# Patient Record
Sex: MALE | Race: WHITE | NOT HISPANIC OR LATINO | Employment: OTHER | URBAN - METROPOLITAN AREA
[De-identification: names, ages, dates, MRNs, and addresses within clinical notes are randomized per-mention and may not be internally consistent; named-entity substitution may affect disease eponyms.]

---

## 2020-09-23 ENCOUNTER — CONVERSION ENCOUNTER (OUTPATIENT)
Dept: UROLOGY | Facility: CLINIC | Age: 80
End: 2020-09-23

## 2020-09-23 ENCOUNTER — HOSPITAL ENCOUNTER (OUTPATIENT)
Dept: LAB | Facility: HOSPITAL | Age: 80
Discharge: HOME | End: 2020-09-23
Attending: UROLOGY

## 2020-09-23 ENCOUNTER — OFFICE VISIT CONVERTED (OUTPATIENT)
Dept: UROLOGY | Facility: CLINIC | Age: 80
End: 2020-09-23
Attending: UROLOGY

## 2020-09-23 LAB
ALBUMIN SERPL-MCNC: 3.7 G/DL (ref 3.5–5)
ALBUMIN/GLOB SERPL: 1.3 {RATIO} (ref 1.4–2.6)
ALP SERPL-CCNC: 128 U/L (ref 56–155)
ALT SERPL-CCNC: 7 U/L (ref 10–40)
ANION GAP SERPL CALC-SCNC: 18 MMOL/L (ref 8–19)
AST SERPL-CCNC: 12 U/L (ref 15–50)
BASOPHILS # BLD AUTO: 0.04 10*3/UL (ref 0–0.2)
BASOPHILS NFR BLD AUTO: 0.9 % (ref 0–3)
BILIRUB SERPL-MCNC: 0.38 MG/DL (ref 0.2–1.3)
BUN SERPL-MCNC: 17 MG/DL (ref 5–25)
BUN/CREAT SERPL: 13 {RATIO} (ref 6–20)
CALCIUM SERPL-MCNC: 9.4 MG/DL (ref 8.7–10.4)
CHLORIDE SERPL-SCNC: 101 MMOL/L (ref 99–111)
CONV ABS IMM GRAN: 0.01 10*3/UL (ref 0–0.2)
CONV CO2: 23 MMOL/L (ref 22–32)
CONV IMMATURE GRAN: 0.2 % (ref 0–1.8)
CONV TOTAL PROTEIN: 6.6 G/DL (ref 6.3–8.2)
CREAT UR-MCNC: 1.34 MG/DL (ref 0.7–1.2)
DEPRECATED RDW RBC AUTO: 50.9 FL (ref 35.1–43.9)
EOSINOPHIL # BLD AUTO: 0.12 10*3/UL (ref 0–0.7)
EOSINOPHIL # BLD AUTO: 2.6 % (ref 0–7)
ERYTHROCYTE [DISTWIDTH] IN BLOOD BY AUTOMATED COUNT: 15.5 % (ref 11.6–14.4)
GFR SERPLBLD BASED ON 1.73 SQ M-ARVRAT: 50 ML/MIN/{1.73_M2}
GLOBULIN UR ELPH-MCNC: 2.9 G/DL (ref 2–3.5)
GLUCOSE SERPL-MCNC: 101 MG/DL (ref 70–99)
HCT VFR BLD AUTO: 38.4 % (ref 42–52)
HGB BLD-MCNC: 11.9 G/DL (ref 14–18)
LYMPHOCYTES # BLD AUTO: 0.94 10*3/UL (ref 1–5)
LYMPHOCYTES NFR BLD AUTO: 20.7 % (ref 20–45)
MCH RBC QN AUTO: 28 PG (ref 27–31)
MCHC RBC AUTO-ENTMCNC: 31 G/DL (ref 33–37)
MCV RBC AUTO: 90.4 FL (ref 80–96)
MONOCYTES # BLD AUTO: 0.45 10*3/UL (ref 0.2–1.2)
MONOCYTES NFR BLD AUTO: 9.9 % (ref 3–10)
NEUTROPHILS # BLD AUTO: 2.99 10*3/UL (ref 2–8)
NEUTROPHILS NFR BLD AUTO: 65.7 % (ref 30–85)
NRBC CBCN: 0 % (ref 0–0.7)
OSMOLALITY SERPL CALC.SUM OF ELEC: 288 MOSM/KG (ref 273–304)
PLATELET # BLD AUTO: 234 10*3/UL (ref 130–400)
PMV BLD AUTO: 10.7 FL (ref 9.4–12.4)
POTASSIUM SERPL-SCNC: 4.2 MMOL/L (ref 3.5–5.3)
PSA SERPL-MCNC: 2.25 NG/ML (ref 0–4)
RBC # BLD AUTO: 4.25 10*6/UL (ref 4.7–6.1)
SODIUM SERPL-SCNC: 138 MMOL/L (ref 135–147)
WBC # BLD AUTO: 4.55 10*3/UL (ref 4.8–10.8)

## 2020-10-22 ENCOUNTER — HOSPITAL ENCOUNTER (OUTPATIENT)
Dept: NUCLEAR MEDICINE | Facility: HOSPITAL | Age: 80
Discharge: HOME | End: 2020-10-22
Attending: NURSE PRACTITIONER

## 2020-12-03 ENCOUNTER — HOSPITAL ENCOUNTER (OUTPATIENT)
Dept: CARDIOLOGY | Facility: HOSPITAL | Age: 80
Discharge: HOME | End: 2020-12-03
Attending: NURSE PRACTITIONER

## 2021-05-10 NOTE — H&P
History and Physical      Patient Name: Adarsh Loving   Patient ID: [id number]   Sex: Male   YOB: 1940    Primary Care Provider: Alf Moon   Referring Provider: Alf Moon    Visit Date: September 23, 2020    Provider: Sanjuana Carter MD   Location: [facility]   Location Address: [address] [place] [address], [address]  [place], [place]  [id number]   Location Phone: [phone]          Chief Complaint  · BILATERAL LESIONS ON KIDNEYS       History Of Present Illness  The patient is a 80 year old male , who presents for evaluation of necrotic lesion on both kidneys, referred from Dr.Bankole SMITHA Moon.          ct 8/10/20.  I do not have patient's CAT scan but he was told years bilateral renal masses but he is not an operable candidate.  He has been having problem with urination for this year.  No gross hematuria.  He has been having some back pain and had some difficulty getting rid of opioids.  Patient has been losing some weight.  No nocturia.  Patient has been having problem with food because he vomits after eating food.  Patient has lost about 60 pounds of weight 6 months       Past Medical History  Anemia; Ankle pain; BPH (benign prostatic hypertrophy); Broken arm; Heart Disease; Hemorrhoids; Hypertension; Hypertrophy of breast; Ischemic heart disease; Lumbago; Memory difficulties; Prostate nodule with urinary obstruction; Renal cancer, left; Renal cancer, right; Sleep apnea; Vomiting; Wrist fracture, left         Medication List  acetaminophen 325 mg oral tablet; cyclobenzaprine 10 mg oral tablet; cyproheptadine 4 mg oral tablet; docusate sodium 250 mg oral capsule; donepezil 10 mg oral tablet; furosemide 40 mg oral tablet; losartan 100 mg oral tablet; meloxicam 7.5 mg oral tablet; metoprolol tartrate 50 mg oral tablet; minoxidil 10 mg oral tablet; mirtazapine 30 mg oral tablet; polyethylene glycol 3350 17 gram/dose oral powder; promethazine 50 mg oral tablet         Allergy  List  amlodipine; lisinopril; niacin; omeprazole       Allergies Reconciled  Social History  Alcohol; Tobacco         Review of Systems  · Constitutional  o Denies  o : fever, headache, chills  · Eyes  o Denies  o : eye pain, double vision, blurred vision  · HENT  o Denies  o : sinus problems, sore throat, ear infection  · Cardiovascular  o Denies  o : chest pain, high blood pressure, varicosities  · Respiratory  o Denies  o : shortness of breath, wheezing, frequent cough  · Gastrointestinal  o Denies  o : nausea, vomiting, heartburn, indigestion, abdominal pain  · Genitourinary  o Admits  o : difficulty voiding  o Denies  o : urgency, frequency, urinary retention, painful urination  · Integument  o Denies  o : rash, itching, boils  · Neurologic  o Denies  o : tingling or numbness, tremors, dizzy spells  · Musculoskeletal  o Denies  o : joint pain, neck pain, back pain  · Endocrine  o Denies  o : cold intolerance, heat intolerance, tired, excessive thirst, sluggish  · Psychiatric  o Admits  o : feels satisfied with life  o Denies  o : severe depression, concerns with hurting themselves  · Heme-Lymph  o Denies  o : swollen glands, blood clotting problems  · Allergic-Immunologic  o Denies  o : sinus allergy symptoms, hay fever      Vitals  Date Time BP Position Site L\R Cuff Size HR RR TEMP (F) WT  HT  BMI kg/m2 BSA m2 O2 Sat        09/23/2020 01:00 /62 Sitting    60 - R   260lbs 0oz 6'   35.26 2.45           Physical Examination  · Constitutional  o Appearance  o : 80-year-old white male who is obese and has a lot of difficulty with ambulation  · Neck  o Thyroid  o : gland size normal, nontender, no nodules or masses present on palpation  · Respiratory  o Respiratory Effort  o : Breathing is unlabored without accessory muscle use  o Inspection of Chest  o : normal appearance, no retractions  o Auscultation of Lungs  o : normal breath sounds throughout  · Cardiovascular  o Heart  o :   § Auscultation of  Heart  § : regular rate and rhythm, no murmurs, gallops or rubs  o Peripheral Vascular System  o : No abnormalities  · Gastrointestinal  o Abdominal Examination  o : abdomen nontender to palpation, normal bowel sounds, tone normal without rigidity or guarding, no masses present, abdomen obese upon supine. Has a large ventral hernia  o Liver and spleen  o : No hepatomegaly present. Liver is non-tender to palpation and spleen is not palpable.  o Hernias  o : No abdominal wall hernias are present.  · Genitourinary  o Bladder  o : no abnormalities  o Penis  o : Uncircumcised normal penis  o Urethral Meatus  o : no abnormalities  o Scrotum and Scrotal Contents  o :   § Scrotum  § : no abnormalities  § Epididymides  § : no abnormalities  § Testes  § : no abnormalities  o Digital Rectal Examination  o :   § Prostate  § : Prostate gland is about 25 g. Left side is normal right side is little indurated to firm  · Lymphatic  o Neck  o : No lymphadenopathy present  o Groin  o : No lymphadenopathy present  · Skin and Subcutaneous Tissue  o General Inspection  o : No rashes, lesions or areas of discoloration present. Skin turgor is normal.  · Neurologic  o Mental Status Examination  o : grossly oriented to person, place and time  o Gait and Station  o : normal gait, able to stand without difficulty  · Psychiatric  o Mood and Affect  o : mood normal, affect appropriate      Figure 1.0: Pain Rating Scale-[place]         Results  · In-Office Procedures  o Lab procedure  § Automated dipstick urinalysis with microscopy (04429)   § Color Ur: Yellow   § Clarity Ur: Clear   § Glucose Ur Ql Strip: Negative   § Bilirub Ur Ql Strip: Negative   § Ketones Ur Ql Strip: Negative   § Sp Gr Ur Qn: 1.015   § Hgb Ur Ql Strip: Negative   § pH Ur-LsCnc: 5.5   § Prot Ur Ql Strip: Negative   § Urobilinogen Ur Strip-mCnc: 0.2 E.U./dL   § Nitrite Ur Ql Strip: Negative   § WBC Est Ur Ql Strip: Negative   § RBC UrnS Qn HPF: 0   § WBC UrnS Qn HPF: 0    § Bacteria UrnS Qn HPF: 0   § Crystals UrnS Qn HPF: 0   § Epithelial Cells (non renal): 0 /HPF  § Epithelial Cells (renal): 0       Assessment  · Prostate nodule with urinary obstruction       Nodular prostate with lower urinary tract symptoms     600.11/N40.3  Other obstructive and reflux uropathy     600.11/N13.8  · Renal cancer, left     189.0/C64.2  · Renal cancer, right     189.0/C64.1  · Vomiting     787.03/R11.10    Problems Reconciled  Plan  · Instructions  o Patient has bilateral renal cancers and on the right side it is in the lower pole and on the left side it is in the upper pole of the kidney. Both of them are not difficult situation to have it removed with the robot. His immediate problem right now is vomiting and he has been losing weight. He needs to have upper endoscopy. I am going to do a CBC CMP and PSA on him and I have discussed with them that if they want to have this tumors removed it can be done with the robot. In my opinion he needs to have an upper endoscopy to see why he is vomiting. Patient and his wife at this time do not want any treatment for the renal tumors because they are asymptomatic and I respect their opinion. After trying to get his old CT scan which was done on 9/11/2018 at [facility] to see the growth of these tumors in 2 years. Also there are new chemotherapeutic drugs for renal cancer which can shrink these tumors if the patient elects to have that done. I be very happy to see him on the follow-up            Electronically Signed by: Sanjuana Carter MD -Author on September 23, 2020 02:09:47 PM

## 2021-05-14 VITALS
HEART RATE: 60 BPM | SYSTOLIC BLOOD PRESSURE: 125 MMHG | BODY MASS INDEX: 35.21 KG/M2 | HEIGHT: 72 IN | DIASTOLIC BLOOD PRESSURE: 62 MMHG | WEIGHT: 260 LBS

## 2021-10-20 ENCOUNTER — OFFICE VISIT (OUTPATIENT)
Dept: CARDIOLOGY | Facility: CLINIC | Age: 81
End: 2021-10-20

## 2021-10-20 ENCOUNTER — CLINICAL SUPPORT NO REQUIREMENTS (OUTPATIENT)
Dept: CARDIOLOGY | Facility: CLINIC | Age: 81
End: 2021-10-20

## 2021-10-20 VITALS
BODY MASS INDEX: 40.66 KG/M2 | SYSTOLIC BLOOD PRESSURE: 143 MMHG | HEIGHT: 66 IN | HEART RATE: 60 BPM | DIASTOLIC BLOOD PRESSURE: 71 MMHG | WEIGHT: 253 LBS

## 2021-10-20 DIAGNOSIS — I44.2 CHB (COMPLETE HEART BLOCK) (HCC): Primary | ICD-10-CM

## 2021-10-20 DIAGNOSIS — Z95.0 PRESENCE OF CARDIAC PACEMAKER: ICD-10-CM

## 2021-10-20 DIAGNOSIS — I10 PRIMARY HYPERTENSION: ICD-10-CM

## 2021-10-20 DIAGNOSIS — I49.5 SSS (SICK SINUS SYNDROME) (HCC): Primary | ICD-10-CM

## 2021-10-20 PROCEDURE — 93000 ELECTROCARDIOGRAM COMPLETE: CPT | Performed by: INTERNAL MEDICINE

## 2021-10-20 PROCEDURE — 99204 OFFICE O/P NEW MOD 45 MIN: CPT | Performed by: INTERNAL MEDICINE

## 2021-10-20 PROCEDURE — 93280 PM DEVICE PROGR EVAL DUAL: CPT | Performed by: INTERNAL MEDICINE

## 2021-10-20 RX ORDER — MELOXICAM 7.5 MG/1
7.5 TABLET ORAL DAILY
Status: ON HOLD | COMMUNITY
End: 2022-07-25

## 2021-10-20 RX ORDER — PANTOPRAZOLE SODIUM 40 MG/1
40 TABLET, DELAYED RELEASE ORAL DAILY
COMMUNITY

## 2021-10-20 RX ORDER — MIRTAZAPINE 30 MG/1
30 TABLET, FILM COATED ORAL DAILY
COMMUNITY

## 2021-10-20 RX ORDER — POLYETHYLENE GLYCOL 3350 17 G/17G
17 POWDER, FOR SOLUTION ORAL DAILY
COMMUNITY
End: 2022-07-26 | Stop reason: HOSPADM

## 2021-10-20 RX ORDER — TERAZOSIN 2 MG/1
2 CAPSULE ORAL DAILY
Status: ON HOLD | COMMUNITY
Start: 2021-08-19 | End: 2022-07-25

## 2021-10-20 RX ORDER — MINOXIDIL 10 MG/1
10 TABLET ORAL 2 TIMES DAILY
COMMUNITY

## 2021-10-20 RX ORDER — MELATONIN
800 DAILY
COMMUNITY

## 2021-10-20 RX ORDER — FUROSEMIDE 40 MG/1
40 TABLET ORAL DAILY
COMMUNITY

## 2021-10-20 RX ORDER — METOPROLOL TARTRATE 50 MG/1
25 TABLET, FILM COATED ORAL 2 TIMES DAILY
COMMUNITY

## 2021-10-20 RX ORDER — LOSARTAN POTASSIUM 100 MG/1
100 TABLET ORAL DAILY
COMMUNITY

## 2021-10-20 RX ORDER — PSEUDOEPHEDRINE HCL 30 MG
250 TABLET ORAL 2 TIMES DAILY
Status: ON HOLD | COMMUNITY
End: 2022-07-25

## 2021-10-20 RX ORDER — BUDESONIDE AND FORMOTEROL FUMARATE DIHYDRATE 80; 4.5 UG/1; UG/1
2 AEROSOL RESPIRATORY (INHALATION)
Status: ON HOLD | COMMUNITY
End: 2022-07-25

## 2021-10-20 NOTE — PROGRESS NOTES
Chief Complaint  new patient and Rapid Heart Rate    Subjective            Adarsh Loving presents to [facility]  History of Present Illness    81-year-old white male.  He has a history of complete heart block with a dual-chamber permanent pacemaker initially implanted in 2008 with a generator replacement 2014.  He is establishing care here locally.  [facility] [place]  The patient is somewhat of a difficult historian, he has cognitive impairment/memory loss and his wife provides most of the history.  He has been clinically stable.  He denies chest pain, excessive shortness of breath.  No problems noted with his pacemaker.    PMH  Past Medical History:   Diagnosis Date   • Cancer (HCC)     both kidneys   • Chronic kidney disease    • COPD (chronic obstructive pulmonary disease) (HCC)    • Hypertension    • Sleep apnea          SURGICALHX  Past Surgical History:   Procedure Laterality Date   • INSERT / REPLACE / REMOVE PACEMAKER          SOC  Social History     Socioeconomic History   • Marital status:    Tobacco Use   • Smoking status: Never Smoker   • Smokeless tobacco: Never Used   Vaping Use   • Vaping Use: Never used   Substance and Sexual Activity   • Alcohol use: Never   • Drug use: Never   • Sexual activity: Defer         FAMHX  Family History   Problem Relation Age of Onset   • Hypertension Father           ALLERGY  Allergies   Allergen Reactions   • Amlodipine Unknown - Low Severity   • Lisinopril Unknown - Low Severity   • Niacin Unknown - Low Severity   • Omeprazole Unknown - Low Severity        MEDSCURRENT    Current Outpatient Medications:   •  budesonide-formoterol (Symbicort) 80-4.5 MCG/ACT inhaler, Inhale 2 puffs., Disp: , Rfl:   •  cholecalciferol (Cholecalciferol) 25 MCG (1000 UT) tablet, Take 800 Units by mouth Daily., Disp: , Rfl:   •  Cyanocobalamin (VITAMIN B 12 PO), Take  by mouth., Disp: , Rfl:   •  docusate  "sodium 250 MG capsule, 250 mg 2 (two) times a day. At bedtime, Disp: , Rfl:   •  furosemide (LASIX) 40 MG tablet, 40 mg Daily., Disp: , Rfl:   •  losartan (COZAAR) 100 MG tablet, 100 mg Daily., Disp: , Rfl:   •  meloxicam (MOBIC) 7.5 MG tablet, 7.5 mg Daily., Disp: , Rfl:   •  metoprolol tartrate (LOPRESSOR) 50 MG tablet, 50 mg 2 (two) times a day., Disp: , Rfl:   •  minoxidil (LONITEN) 10 MG tablet, 10 mg 2 (two) times a day., Disp: , Rfl:   •  mirtazapine (REMERON) 30 MG tablet, 30 mg Daily. bedtime, Disp: , Rfl:   •  pantoprazole (PROTONIX) 40 MG EC tablet, 40 mg Daily., Disp: , Rfl:   •  polyethylene glycol (MIRALAX) 17 GM/SCOOP powder, 17 g Daily., Disp: , Rfl:   •  terazosin (HYTRIN) 2 MG capsule, Take 2 mg by mouth Daily., Disp: , Rfl:       Review of Systems   Constitutional: Positive for malaise/fatigue.   HENT: Negative.    Eyes: Negative.    Cardiovascular: Negative for chest pain and irregular heartbeat.   Respiratory: Positive for shortness of breath.    Endocrine: Negative.    Hematologic/Lymphatic: Negative.    Skin: Negative.    Musculoskeletal: Negative.    Gastrointestinal: Negative.    Genitourinary: Negative.    Neurological: Negative.    Psychiatric/Behavioral: Negative.         Objective     /71   Pulse 60   Ht 167.6 cm (66\")   Wt 115 kg (253 lb)   BMI 40.84 kg/m²       General Appearance:   · well developed  · well nourished  HENT:   · oropharynx moist  · lips not cyanotic  Neck:  · thyroid not enlarged  · supple  Respiratory:  · no respiratory distress  · normal breath sounds  · no rales  Cardiovascular:  · no jugular venous distention  · regular rhythm  · apical impulse normal  · S1 normal, S2 normal  · no S3, no S4   · no murmur  · no rub, no thrill  · carotid pulses normal; no bruit  · pedal pulses normal  · lower extremity edema: Trace  Musculoskeletal:  · no clubbing of fingers.   · normocephalic, head atraumatic  Skin:   · warm, dry  Psychiatric:  · judgement and insight " appropriate  · normal mood and affect      Result Review :     The following data was reviewed by: González Newman MD on 10/20/2021:                      Data reviewed: Laboratory studies reviewed creatinine 1.34, hemoglobin 11.9.  Nuclear stress test reviewed October 2020 no ischemia ejection fraction 51%.  Echo 2020 showed mild LVH, grade 1 diastolic dysfunction, EF 50 to 55% with small pericardial effusion     Dual-chamber pacemaker interrogated today, battery life 3 to 4 years, 71% atrial pacing, 99% ventricular pacing no episodes, normal lead measurements      ECG 12 Lead    Date/Time: 10/20/2021 12:11 PM  Performed by: DENISE Newman MD  Authorized by: DENISE Newman MD   Rhythm: sinus rhythm and paced  Pacing: ventricular paced rhythm  Clinical impression: abnormal EKG                       Assessment and Plan        ASSESSMENT:  Encounter Diagnoses   Name Primary?   • CHB (complete heart block) (HCC) Yes   • Primary hypertension    • Presence of cardiac pacemaker          PLAN:    1.  Mr. Loving is clinically stable.  He has complete heart block and a normally functioning dual-chamber [place] pacemaker.  Lead measurements are normal today.  2.  Blood pressure stable, continue current medical therapy  3.  We will establish remote pacemaker monitoring here locally, otherwise the patient can be followed annually unless problems arise.          Patient was given instructions and counseling regarding his condition or for health maintenance advice. Please see specific information pulled into the AVS if appropriate.             DENISE Newman MD  10/20/2021    12:09 EDT

## 2021-10-20 NOTE — PROGRESS NOTES
Normal Dual Chamber Pacemaker device interrogation.  Normal evaluation of device function and lead measurements.  No optimization was needed of parameters or maximization of device longevity.  Patient is on automated Home Remote Monitoring and I will request transfer from the [facility] for home remotes.  Remaining battery is 4 years.

## 2022-07-25 ENCOUNTER — HOSPITAL ENCOUNTER (OUTPATIENT)
Facility: HOSPITAL | Age: 82
Setting detail: OBSERVATION
Discharge: HOME | End: 2022-07-26
Attending: HOSPITALIST | Admitting: INTERNAL MEDICINE

## 2022-07-25 DIAGNOSIS — Z78.9 DECREASED ACTIVITIES OF DAILY LIVING (ADL): Primary | ICD-10-CM

## 2022-07-25 DIAGNOSIS — R26.2 DIFFICULTY WALKING: ICD-10-CM

## 2022-07-25 PROBLEM — R10.9 ABDOMINAL PAIN: Status: ACTIVE | Noted: 2022-07-25

## 2022-07-25 LAB
ALBUMIN SERPL-MCNC: 3.7 G/DL (ref 3.5–5.2)
ALBUMIN/GLOB SERPL: 1.3 G/DL
ALP SERPL-CCNC: 112 U/L (ref 39–117)
ALT SERPL W P-5'-P-CCNC: 5 U/L (ref 1–41)
ANION GAP SERPL CALCULATED.3IONS-SCNC: 10.5 MMOL/L (ref 5–15)
ANION GAP SERPL CALCULATED.3IONS-SCNC: 9.8 MMOL/L (ref 5–15)
AST SERPL-CCNC: 14 U/L (ref 1–40)
BASOPHILS # BLD AUTO: 0.01 10*3/MM3 (ref 0–0.2)
BASOPHILS NFR BLD AUTO: 0.3 % (ref 0–1.5)
BILIRUB SERPL-MCNC: 0.9 MG/DL (ref 0–1.2)
BUN SERPL-MCNC: 20 MG/DL (ref 8–23)
BUN SERPL-MCNC: 21 MG/DL (ref 8–23)
BUN/CREAT SERPL: 14.8 (ref 7–25)
BUN/CREAT SERPL: 16.3 (ref 7–25)
CALCIUM SPEC-SCNC: 9.1 MG/DL (ref 8.6–10.5)
CALCIUM SPEC-SCNC: 9.1 MG/DL (ref 8.6–10.5)
CHLORIDE SERPL-SCNC: 107 MMOL/L (ref 98–107)
CHLORIDE SERPL-SCNC: 107 MMOL/L (ref 98–107)
CO2 SERPL-SCNC: 22.5 MMOL/L (ref 22–29)
CO2 SERPL-SCNC: 24.2 MMOL/L (ref 22–29)
CREAT SERPL-MCNC: 1.23 MG/DL (ref 0.76–1.27)
CREAT SERPL-MCNC: 1.42 MG/DL (ref 0.76–1.27)
CRP SERPL-MCNC: 4.03 MG/DL (ref 0–0.5)
DEPRECATED RDW RBC AUTO: 44.7 FL (ref 37–54)
EGFRCR SERPLBLD CKD-EPI 2021: 49.6 ML/MIN/1.73
EGFRCR SERPLBLD CKD-EPI 2021: 59 ML/MIN/1.73
EOSINOPHIL # BLD AUTO: 0.03 10*3/MM3 (ref 0–0.4)
EOSINOPHIL NFR BLD AUTO: 0.8 % (ref 0.3–6.2)
ERYTHROCYTE [DISTWIDTH] IN BLOOD BY AUTOMATED COUNT: 13.8 % (ref 12.3–15.4)
FERRITIN SERPL-MCNC: 87.88 NG/ML (ref 30–400)
GLOBULIN UR ELPH-MCNC: 2.8 GM/DL
GLUCOSE SERPL-MCNC: 89 MG/DL (ref 65–99)
GLUCOSE SERPL-MCNC: 97 MG/DL (ref 65–99)
HCT VFR BLD AUTO: 33.7 % (ref 37.5–51)
HGB BLD-MCNC: 10.9 G/DL (ref 13–17.7)
IMM GRANULOCYTES # BLD AUTO: 0.01 10*3/MM3 (ref 0–0.05)
IMM GRANULOCYTES NFR BLD AUTO: 0.3 % (ref 0–0.5)
LYMPHOCYTES # BLD AUTO: 0.73 10*3/MM3 (ref 0.7–3.1)
LYMPHOCYTES NFR BLD AUTO: 20.4 % (ref 19.6–45.3)
MCH RBC QN AUTO: 28.7 PG (ref 26.6–33)
MCHC RBC AUTO-ENTMCNC: 32.3 G/DL (ref 31.5–35.7)
MCV RBC AUTO: 88.7 FL (ref 79–97)
MONOCYTES # BLD AUTO: 0.51 10*3/MM3 (ref 0.1–0.9)
MONOCYTES NFR BLD AUTO: 14.2 % (ref 5–12)
NEUTROPHILS NFR BLD AUTO: 2.29 10*3/MM3 (ref 1.7–7)
NEUTROPHILS NFR BLD AUTO: 64 % (ref 42.7–76)
NRBC BLD AUTO-RTO: 0 /100 WBC (ref 0–0.2)
PLATELET # BLD AUTO: 162 10*3/MM3 (ref 140–450)
PMV BLD AUTO: 10.4 FL (ref 6–12)
POTASSIUM SERPL-SCNC: 3.8 MMOL/L (ref 3.5–5.2)
POTASSIUM SERPL-SCNC: 4.2 MMOL/L (ref 3.5–5.2)
PROT SERPL-MCNC: 6.5 G/DL (ref 6–8.5)
RBC # BLD AUTO: 3.8 10*6/MM3 (ref 4.14–5.8)
SODIUM SERPL-SCNC: 140 MMOL/L (ref 136–145)
SODIUM SERPL-SCNC: 141 MMOL/L (ref 136–145)
TSH SERPL DL<=0.05 MIU/L-ACNC: 3.37 UIU/ML (ref 0.27–4.2)
WBC NRBC COR # BLD: 3.58 10*3/MM3 (ref 3.4–10.8)

## 2022-07-25 PROCEDURE — 82728 ASSAY OF FERRITIN: CPT | Performed by: FAMILY MEDICINE

## 2022-07-25 PROCEDURE — 99219 PR INITIAL OBSERVATION CARE/DAY 50 MINUTES: CPT | Performed by: INTERNAL MEDICINE

## 2022-07-25 PROCEDURE — 86140 C-REACTIVE PROTEIN: CPT | Performed by: FAMILY MEDICINE

## 2022-07-25 PROCEDURE — 84443 ASSAY THYROID STIM HORMONE: CPT | Performed by: INTERNAL MEDICINE

## 2022-07-25 PROCEDURE — 96372 THER/PROPH/DIAG INJ SC/IM: CPT

## 2022-07-25 PROCEDURE — 80053 COMPREHEN METABOLIC PANEL: CPT | Performed by: HOSPITALIST

## 2022-07-25 PROCEDURE — G0379 DIRECT REFER HOSPITAL OBSERV: HCPCS

## 2022-07-25 PROCEDURE — G0378 HOSPITAL OBSERVATION PER HR: HCPCS

## 2022-07-25 PROCEDURE — 96360 HYDRATION IV INFUSION INIT: CPT

## 2022-07-25 PROCEDURE — 25010000002 HEPARIN (PORCINE) PER 1000 UNITS: Performed by: INTERNAL MEDICINE

## 2022-07-25 PROCEDURE — 85025 COMPLETE CBC W/AUTO DIFF WBC: CPT | Performed by: HOSPITALIST

## 2022-07-25 PROCEDURE — 96361 HYDRATE IV INFUSION ADD-ON: CPT

## 2022-07-25 RX ORDER — AMOXICILLIN 250 MG
2 CAPSULE ORAL 2 TIMES DAILY
Status: DISCONTINUED | OUTPATIENT
Start: 2022-07-25 | End: 2022-07-26 | Stop reason: HOSPADM

## 2022-07-25 RX ORDER — LOSARTAN POTASSIUM 25 MG/1
100 TABLET ORAL
Status: DISCONTINUED | OUTPATIENT
Start: 2022-07-25 | End: 2022-07-26 | Stop reason: HOSPADM

## 2022-07-25 RX ORDER — LEVOTHYROXINE SODIUM 0.05 MG/1
50 TABLET ORAL
Status: DISCONTINUED | OUTPATIENT
Start: 2022-07-25 | End: 2022-07-26 | Stop reason: HOSPADM

## 2022-07-25 RX ORDER — DEXTROSE AND SODIUM CHLORIDE 5; .9 G/100ML; G/100ML
60 INJECTION, SOLUTION INTRAVENOUS CONTINUOUS
Status: DISCONTINUED | OUTPATIENT
Start: 2022-07-25 | End: 2022-07-25

## 2022-07-25 RX ORDER — ACETAMINOPHEN 325 MG/1
650 TABLET ORAL EVERY 6 HOURS PRN
Status: DISCONTINUED | OUTPATIENT
Start: 2022-07-25 | End: 2022-07-26 | Stop reason: HOSPADM

## 2022-07-25 RX ORDER — ONDANSETRON 2 MG/ML
4 INJECTION INTRAMUSCULAR; INTRAVENOUS EVERY 6 HOURS PRN
Status: DISCONTINUED | OUTPATIENT
Start: 2022-07-25 | End: 2022-07-26 | Stop reason: HOSPADM

## 2022-07-25 RX ORDER — PRAZOSIN HYDROCHLORIDE 2 MG/1
2 CAPSULE ORAL NIGHTLY
COMMUNITY

## 2022-07-25 RX ORDER — BISACODYL 10 MG
10 SUPPOSITORY, RECTAL RECTAL DAILY PRN
Status: DISCONTINUED | OUTPATIENT
Start: 2022-07-25 | End: 2022-07-26 | Stop reason: HOSPADM

## 2022-07-25 RX ORDER — MINOXIDIL 10 MG/1
10 TABLET ORAL 2 TIMES DAILY
Status: DISCONTINUED | OUTPATIENT
Start: 2022-07-25 | End: 2022-07-26 | Stop reason: HOSPADM

## 2022-07-25 RX ORDER — PANTOPRAZOLE SODIUM 40 MG/1
40 TABLET, DELAYED RELEASE ORAL
Status: DISCONTINUED | OUTPATIENT
Start: 2022-07-25 | End: 2022-07-26 | Stop reason: HOSPADM

## 2022-07-25 RX ORDER — TERAZOSIN 1 MG/1
2 CAPSULE ORAL NIGHTLY
Status: DISCONTINUED | OUTPATIENT
Start: 2022-07-25 | End: 2022-07-26 | Stop reason: HOSPADM

## 2022-07-25 RX ORDER — HEPARIN SODIUM 5000 [USP'U]/ML
5000 INJECTION, SOLUTION INTRAVENOUS; SUBCUTANEOUS EVERY 8 HOURS SCHEDULED
Status: DISCONTINUED | OUTPATIENT
Start: 2022-07-25 | End: 2022-07-26 | Stop reason: HOSPADM

## 2022-07-25 RX ORDER — BISACODYL 5 MG/1
5 TABLET, DELAYED RELEASE ORAL DAILY PRN
Status: DISCONTINUED | OUTPATIENT
Start: 2022-07-25 | End: 2022-07-26 | Stop reason: HOSPADM

## 2022-07-25 RX ORDER — LEVOTHYROXINE SODIUM 0.05 MG/1
50 TABLET ORAL
COMMUNITY
Start: 2022-07-24

## 2022-07-25 RX ORDER — POLYETHYLENE GLYCOL 3350 17 G/17G
17 POWDER, FOR SOLUTION ORAL DAILY PRN
Status: DISCONTINUED | OUTPATIENT
Start: 2022-07-25 | End: 2022-07-26 | Stop reason: HOSPADM

## 2022-07-25 RX ORDER — CYPROHEPTADINE HYDROCHLORIDE 4 MG/1
4 TABLET ORAL 3 TIMES DAILY
COMMUNITY

## 2022-07-25 RX ORDER — LANOLIN ALCOHOL/MO/W.PET/CERES
1000 CREAM (GRAM) TOPICAL DAILY
COMMUNITY

## 2022-07-25 RX ORDER — MIRTAZAPINE 15 MG/1
30 TABLET, FILM COATED ORAL NIGHTLY
Status: DISCONTINUED | OUTPATIENT
Start: 2022-07-25 | End: 2022-07-26 | Stop reason: HOSPADM

## 2022-07-25 RX ADMIN — LOSARTAN POTASSIUM 100 MG: 25 TABLET, FILM COATED ORAL at 09:57

## 2022-07-25 RX ADMIN — MINOXIDIL 10 MG: 10 TABLET ORAL at 09:56

## 2022-07-25 RX ADMIN — HEPARIN SODIUM 5000 UNITS: 5000 INJECTION, SOLUTION INTRAVENOUS; SUBCUTANEOUS at 22:43

## 2022-07-25 RX ADMIN — HEPARIN SODIUM 5000 UNITS: 5000 INJECTION, SOLUTION INTRAVENOUS; SUBCUTANEOUS at 14:02

## 2022-07-25 RX ADMIN — MINOXIDIL 10 MG: 10 TABLET ORAL at 20:34

## 2022-07-25 RX ADMIN — MIRTAZAPINE 30 MG: 15 TABLET, FILM COATED ORAL at 20:34

## 2022-07-25 RX ADMIN — METOPROLOL TARTRATE 25 MG: 25 TABLET, FILM COATED ORAL at 09:57

## 2022-07-25 RX ADMIN — SENNOSIDES AND DOCUSATE SODIUM 2 TABLET: 50; 8.6 TABLET ORAL at 20:34

## 2022-07-25 RX ADMIN — PANTOPRAZOLE SODIUM 40 MG: 40 TABLET, DELAYED RELEASE ORAL at 06:11

## 2022-07-25 RX ADMIN — HEPARIN SODIUM 5000 UNITS: 5000 INJECTION, SOLUTION INTRAVENOUS; SUBCUTANEOUS at 06:11

## 2022-07-25 RX ADMIN — METOPROLOL TARTRATE 25 MG: 25 TABLET, FILM COATED ORAL at 20:34

## 2022-07-25 RX ADMIN — LEVOTHYROXINE SODIUM 50 MCG: 50 TABLET ORAL at 09:56

## 2022-07-25 RX ADMIN — DEXTROSE AND SODIUM CHLORIDE 60 ML/HR: 5; 900 INJECTION, SOLUTION INTRAVENOUS at 04:07

## 2022-07-25 RX ADMIN — TERAZOSIN HYDROCHLORIDE 2 MG: 1 CAPSULE ORAL at 20:34

## 2022-07-25 NOTE — H&P
[facility]   Patient Name: Adarsh Loving  : 1940  MRN: [id number]    Primary Care Physician:  Alf Moon MD  Family Point of Contact:  Date of admission: 2022      Subjective     Chief Complaint: lower abd pain +/- ileus     HPI:  Adarsh Loving is an obese, hypertensive, and COVID positive (incidentally dxed today) 82M with low grade bilateral renal cell carcinoma. He presents via transfer from [facility]  vague lower abd pain.    He has mild cognitive impairment and is unfortunately a limited historian.    Denies present pain, bleeding from any orifice, constitutional/GI/pulmonary sxs. He states his last BM was > 24 hrs ago.    CT (C-) abd/pelvis from [facility] suggests ileus. He also has a significant colonic stool burden.     He is presently quite comfortable and neither febrile nor septic, with a sCr 1.5 and sK+ 4.0.    Personal History     Past Medical History:  Past Medical History:   Diagnosis Date   • Cancer (HCC)     both kidneys   • Chronic kidney disease    • COPD (chronic obstructive pulmonary disease) (HCC)    • Hypertension    • Sleep apnea      Hypothyroidism  MCI    Past Surgical History:  Past Surgical History:   Procedure Laterality Date   • INSERT / REPLACE / REMOVE PACEMAKER     • JOINT REPLACEMENT       RYGB     Family History:   Family History   Problem Relation Age of Onset   • Hypertension Father       Non-contributory    Social History:    with  Retired  Never smoker without deleterious habits    Home Medications:  Cyanocobalamin, budesonide-formoterol, cholecalciferol, docusate sodium, furosemide, losartan, meloxicam, metoprolol tartrate, minoxidil, mirtazapine, pantoprazole, polyethylene glycol, and terazosin    Allergies:  Allergies   Allergen Reactions   • Amlodipine Unknown - Low Severity   • Lisinopril Unknown - Low Severity   • Niacin Unknown - Low Severity   • Omeprazole  Unknown - Low Severity       Review of Systems  Please see HPI. All else asked and negative.    Objective     Vitals:        Physical Exam    Constitutional: Alert & awake, non-septic appearing, NAD   Eyes: PERRLA, EOMI, sclerae anicteric, no conjunctival injection   HENT: NC/AT, mucous membranes moist   Neck: Supple, no JVD, thyromegaly, or lymphadenopathy; trachea midline   Respiratory: Clear to auscultation bilaterally without wheezes, rhonchi, or rales   Cardiovascular: RRR, no murmurs, rubs, or gallops   Gastrointestinal: Soft, NT/ND with NABS x4; no ascites   Musculoskeletal: No c/c/e; palpable pedal pulses bilaterally   Psychiatric: Appropriate affect, cooperative but confused   Neurologic: Awake and alert; CN II-XII grossly intact; no tremor; moves all four extremities without sensory   deficits  Skin: No rashes, breakdown, or bleeding from any orifice.  Rectal: deferred    Result Review    Result Review:  I have personally reviewed the results from the time of this admission to 7/25/2022 02:47 EDT and agree with these findings:  [x]  Laboratory  [x]  Microbiology  [x]  Radiology  [x]  EKG/Telemetry   []  Cardiology/Vascular   []  Pathology  []  Old records  []  Other:      Assessment & Plan   Assessment / Plan     Assessment:  Ileus (?) with sig colonic stool burden  Low grade bilat RCCa  COVID without hypoxia  HTN necessitating polypharmacy  Hypothyroidism    Plan:   Admit to medicine for IVF, SSE, and repeat imaging in 24-48 hrs  Rx reconciliation; hold furosemide & give minoxidil with parameters  NPO except Rxs  TSH  DVT prophylaxis vis sc heparin  PT consult    CODE STATUS:         Admission Status:  I believe this patient meets inpatient status.    Electronically signed by Tucker Schwab DO, 07/25/22, 2:47 AM EDT.

## 2022-07-25 NOTE — PLAN OF CARE
Goal Outcome Evaluation:  Plan of Care Reviewed With: patient        Progress: no change  Outcome Evaluation: VSS. Patient arrived from [facility]. Admission and assessment completed. Patient received soap suds enema with moderate, soft, brown stool resulting. Patient had no complaints of pain, nausea, or vomiting this shift.

## 2022-07-25 NOTE — PROGRESS NOTES
Patient seen and examined this afternoon resting comfortably in bedside chair visiting with family.  She has had 2 bowel movements since admission nonbloody.  Patient states abdominal pain much improved still has a little bit of nausea but willing to try clear liquids.  Patient states he did have a cough that started about 5 days ago but is improved since then.  Patient denies any change in taste or smell denies fever denies myalgias.  Discussed patient's increased risk of COVID-19 disease progression due to age and that the patient was just inside in the therapeutic window for antivirals at this time.  Patient did not want to treat with antivirals at this time and continue to monitor symptoms.  Patient started on bowel regimen and diet advanced to clears with instructions to nursing to advance as tolerated as long as patient did not develop vomiting.  Anticipate patient likely be able discharge home in the next 24 to 48 hours if able to tolerate a diet no further issues.

## 2022-07-25 NOTE — PLAN OF CARE
Goal Outcome Evaluation:               Patient has had 2 bowel movements today. He states that he is not having pain in his abdomen at this time. Will begin bowel regimen to promote further bowel movements per Dr Aguilera. IV fluids stopped and clear liquid diet began. He was able to tolerate clear liquids since 2 pm. We are advancing his diet to full liquids now. House approval for wife to stay at bedside. Guest tray approval also given by Dr Aguilera and . Patient did not want to start COVID treatment at this time since he is asymptomatic. [facility] gave him a rapid antigen test for transfer for our facility that resulted positive.

## 2022-07-26 VITALS
BODY MASS INDEX: 33.26 KG/M2 | HEART RATE: 62 BPM | DIASTOLIC BLOOD PRESSURE: 60 MMHG | HEIGHT: 72 IN | SYSTOLIC BLOOD PRESSURE: 125 MMHG | WEIGHT: 245.59 LBS | RESPIRATION RATE: 18 BRPM | TEMPERATURE: 98.4 F | OXYGEN SATURATION: 100 %

## 2022-07-26 LAB
ANION GAP SERPL CALCULATED.3IONS-SCNC: 8.8 MMOL/L (ref 5–15)
BUN SERPL-MCNC: 15 MG/DL (ref 8–23)
BUN/CREAT SERPL: 13 (ref 7–25)
CALCIUM SPEC-SCNC: 8.9 MG/DL (ref 8.6–10.5)
CHLORIDE SERPL-SCNC: 107 MMOL/L (ref 98–107)
CO2 SERPL-SCNC: 22.2 MMOL/L (ref 22–29)
CREAT SERPL-MCNC: 1.15 MG/DL (ref 0.76–1.27)
DEPRECATED RDW RBC AUTO: 42.5 FL (ref 37–54)
EGFRCR SERPLBLD CKD-EPI 2021: 63.9 ML/MIN/1.73
ERYTHROCYTE [DISTWIDTH] IN BLOOD BY AUTOMATED COUNT: 13.2 % (ref 12.3–15.4)
GLUCOSE SERPL-MCNC: 93 MG/DL (ref 65–99)
HCT VFR BLD AUTO: 31.8 % (ref 37.5–51)
HGB BLD-MCNC: 10.4 G/DL (ref 13–17.7)
MCH RBC QN AUTO: 28.5 PG (ref 26.6–33)
MCHC RBC AUTO-ENTMCNC: 32.7 G/DL (ref 31.5–35.7)
MCV RBC AUTO: 87.1 FL (ref 79–97)
PLATELET # BLD AUTO: 157 10*3/MM3 (ref 140–450)
PMV BLD AUTO: 9.9 FL (ref 6–12)
POTASSIUM SERPL-SCNC: 3.7 MMOL/L (ref 3.5–5.2)
RBC # BLD AUTO: 3.65 10*6/MM3 (ref 4.14–5.8)
SODIUM SERPL-SCNC: 138 MMOL/L (ref 136–145)
WBC NRBC COR # BLD: 3.5 10*3/MM3 (ref 3.4–10.8)

## 2022-07-26 PROCEDURE — 80048 BASIC METABOLIC PNL TOTAL CA: CPT | Performed by: FAMILY MEDICINE

## 2022-07-26 PROCEDURE — 99217 PR OBSERVATION CARE DISCHARGE MANAGEMENT: CPT | Performed by: INTERNAL MEDICINE

## 2022-07-26 PROCEDURE — G0378 HOSPITAL OBSERVATION PER HR: HCPCS

## 2022-07-26 PROCEDURE — 97165 OT EVAL LOW COMPLEX 30 MIN: CPT

## 2022-07-26 PROCEDURE — 85027 COMPLETE CBC AUTOMATED: CPT | Performed by: FAMILY MEDICINE

## 2022-07-26 PROCEDURE — 97530 THERAPEUTIC ACTIVITIES: CPT

## 2022-07-26 PROCEDURE — 96372 THER/PROPH/DIAG INJ SC/IM: CPT

## 2022-07-26 PROCEDURE — 97161 PT EVAL LOW COMPLEX 20 MIN: CPT

## 2022-07-26 PROCEDURE — 25010000002 HEPARIN (PORCINE) PER 1000 UNITS: Performed by: INTERNAL MEDICINE

## 2022-07-26 RX ORDER — POLYETHYLENE GLYCOL 3350 17 G/17G
17 POWDER, FOR SOLUTION ORAL DAILY
Qty: 7 PACKET | Refills: 0 | Status: SHIPPED | OUTPATIENT
Start: 2022-07-26 | End: 2022-08-02

## 2022-07-26 RX ADMIN — HEPARIN SODIUM 5000 UNITS: 5000 INJECTION, SOLUTION INTRAVENOUS; SUBCUTANEOUS at 06:07

## 2022-07-26 RX ADMIN — PANTOPRAZOLE SODIUM 40 MG: 40 TABLET, DELAYED RELEASE ORAL at 06:08

## 2022-07-26 RX ADMIN — SENNOSIDES AND DOCUSATE SODIUM 2 TABLET: 50; 8.6 TABLET ORAL at 08:24

## 2022-07-26 RX ADMIN — LEVOTHYROXINE SODIUM 50 MCG: 50 TABLET ORAL at 06:08

## 2022-07-26 NOTE — THERAPY EVALUATION
Patient Name: Adarsh Loving  : 1940    MRN: [id number]                              Today's Date: 2022       Admit Date: 2022    Visit Dx:     ICD-10-CM ICD-9-CM   1. Decreased activities of daily living (ADL)  Z78.9 V49.89     Patient Active Problem List   Diagnosis   • Abdominal pain     Past Medical History:   Diagnosis Date   • Cancer (HCC)     both kidneys   • Chronic kidney disease    • COPD (chronic obstructive pulmonary disease) (HCC)    • Hypertension    • Sleep apnea      Past Surgical History:   Procedure Laterality Date   • INSERT / REPLACE / REMOVE PACEMAKER     • JOINT REPLACEMENT        General Information     Row Name 22 0913 22       OT Time and Intention    Document Type therapy note (daily note)  -PG evaluation  -PG    Mode of Treatment individual therapy;occupational therapy  -PG individual therapy;occupational therapy  -PG    Row Name 22          General Information    Patient Profile Reviewed yes  History with taken from patient's wife who states he is normally independent with his self-care activities.  He uses a walker only when walking into the bathroom to shower.  -PG     Prior Level of Function independent:;ADL's;transfer  -PG     Existing Precautions/Restrictions fall  -PG     Barriers to Rehab none identified  -PG     Row Name 22          Occupational Profile    Reason for Services/Referral (Occupational Profile) Patient is a pleasantly confused 82-year-old male admitted for abdominal pain and COVID.  No prior OT services reported.  Patient is being evaluated to assess ADL status and determine appropriate discharge needs.  -PG     Row Name 22          Cognition    Orientation Status (Cognition) oriented to;person;verbal cues/prompts needed for orientation;place  -PG     Row Name 22          Safety Issues, Functional Mobility    Safety Issues Affecting Function (Mobility) ability to follow commands;awareness of  need for assistance  -PG     Impairments Affecting Function (Mobility) balance;endurance/activity tolerance;strength;cognition  -PG     Cognitive Impairments, Mobility Safety/Performance problem-solving/reasoning;sequencing abilities;judgment  -PG           User Key  (r) = Recorded By, (t) = Taken By, (c) = Cosigned By    Initials Name Provider Type    PG Tyrone Rodriguez OT Occupational Therapist                 Mobility/ADL's     Row Name 07/26/22 0914 07/26/22 0905       Transfers    Transfers sit-stand transfer  -PG sit-stand transfer  -PG    Sit-Stand [place] (Transfers) contact guard;verbal cues  -PG contact guard;verbal cues  -PG    Row Name 07/26/22 0914          Sit-Stand Transfer    Assistive Device (Sit-Stand Transfers) walker, front-wheeled  -PG     Row Name 07/26/22 0905          Activities of Daily Living    BADL Assessment/Intervention bathing;upper body dressing;lower body dressing;grooming;toileting  -PG     Row Name 07/26/22 0914 07/26/22 0905       Bathing Assessment/Intervention    [place] Level (Bathing) bathing skills;moderate assist (50% patient effort)  -PG bathing skills;moderate assist (50% patient effort)  -PG    Row Name 07/26/22 0905          Upper Body Dressing Assessment/Training    [place] Level (Upper Body Dressing) upper body dressing skills;set up  -PG     Row Name 07/26/22 0905          Lower Body Dressing Assessment/Training    [place] Level (Lower Body Dressing) lower body dressing skills;moderate assist (50% patient effort)  -PG     Row Name 07/26/22 0905          Grooming Assessment/Training    [place] Level (Grooming) grooming skills;minimum assist (75% patient effort)  -PG     Row Name 07/26/22 0905          Toileting Assessment/Training    [place] Level (Toileting) toileting skills;moderate assist (50% patient effort)  -PG     Position (Toileting) supported standing  -PG           User Key  (r) = Recorded By, (t) = Taken By, (c) = Cosigned By     Initials Name Provider Type    PG Tyrone Rodriguez OT Occupational Therapist               Obj/Interventions     [facility] Name 07/26/22 0906          Sensory Assessment (Somatosensory)    Sensory Assessment (Somatosensory) sensation intact  -PG     Row Name 07/26/22 0906          Vision Assessment/Intervention    Visual Impairment/Limitations WFL  -PG     Row Name 07/26/22 0906          Range of Motion Comprehensive    General Range of Motion no range of motion deficits identified  -PG     Row Name 07/26/22 0906          Strength Comprehensive (MMT)    General Manual Muscle Testing (MMT) Assessment no strength deficits identified  -PG           User Key  (r) = Recorded By, (t) = Taken By, (c) = Cosigned By    Initials Name Provider Type    PG Tyrone Rodriguez OT Occupational Therapist               Goals/Plan     Row Name 07/26/22 0907          Transfer Goal 1 (OT)    Activity/Assistive Device (Transfer Goal 1, OT) transfers, all  -PG     [place] Level/Cues Needed (Transfer Goal 1, OT) supervision required  -PG     Time Frame (Transfer Goal 1, OT) long term goal (LTG);10 days  -PG     Row Name 07/26/22 0907          Bathing Goal 1 (OT)    Activity/Device (Bathing Goal 1, OT) bathing skills, all  -PG     [place] Level/Cues Needed (Bathing Goal 1, OT) supervision required  -PG     Time Frame (Bathing Goal 1, OT) long term goal (LTG);10 days  -PG     Row Name 07/26/22 0907          Dressing Goal 1 (OT)    Activity/Device (Dressing Goal 1, OT) dressing skills, all  -PG     [place]/Cues Needed (Dressing Goal 1, OT) supervision required  -PG     Time Frame (Dressing Goal 1, OT) long term goal (LTG);10 days  -PG     Row Name 07/26/22 0907          Toileting Goal 1 (OT)    Activity/Device (Toileting Goal 1, OT) toileting skills, all  -PG     [place] Level/Cues Needed (Toileting Goal 1, OT) supervision required  -PG     Time Frame (Toileting Goal 1, OT) long term goal (LTG);10 days  -PG     Row Name 07/26/22 0907           Grooming Goal 1 (OT)    [place] (Grooming Goal 1, OT) supervision required  -PG     Time Frame (Grooming Goal 1, OT) long term goal (LTG);10 days  -PG     Row Name 07/26/22 0907          Problem Specific Goal 1 (OT)    Problem Specific Goal 1 (OT) Patient will improve activity tolerance to fair plus to support independence and engagement in ADL activities  -PG     Time Frame (Problem Specific Goal 1, OT) long term goal (LTG);10 days  -PG     Row Name 07/26/22 0907          Therapy Assessment/Plan (OT)    Planned Therapy Interventions (OT) activity tolerance training;BADL retraining;transfer/mobility retraining;strengthening exercise;patient/caregiver education/training;occupation/activity based interventions  -PG           User Key  (r) = Recorded By, (t) = Taken By, (c) = Cosigned By    Initials Name Provider Type    PG Tyrone Rodriguez, OT Occupational Therapist               Clinical Impression     Row Name 07/26/22 0907          Pain Assessment    Pretreatment Pain Rating 0/10 - no pain  -PG     Posttreatment Pain Rating 0/10 - no pain  -PG     Row Name 07/26/22 0907          Plan of Care Review    Plan of Care Reviewed With patient  -PG     Progress no change  -PG     Outcome Evaluation Patient presents with limitations affecting strength, activity tolerance, and balance impacting patient's ability to return home safely and independently.  The skills of a therapist will be required to safely and effectively implement the following treatment plan to restore maximal level of function  -PG     Row Name 07/26/22 0907          Therapy Assessment/Plan (OT)    Patient/Family Therapy Goal Statement (OT) Patient would like to return home independently  -PG     Rehab Potential (OT) good, to achieve stated therapy goals  -PG     Criteria for Skilled Therapeutic Interventions Met (OT) yes;meets criteria;skilled treatment is necessary  -PG     Therapy Frequency (OT) 5 times/wk  -PG     Row Name 07/26/22 0907           Therapy Plan Review/Discharge Plan (OT)    Anticipated Discharge Disposition (OT) home with home health;home with 24/7 care  -PG           User Key  (r) = Recorded By, (t) = Taken By, (c) = Cosigned By    Initials Name Provider Type    PG Tyrone Rodriguez, OT Occupational Therapist               Outcome Measures     Row Name 07/26/22 0909          How much help from another is currently needed...    Putting on and taking off regular lower body clothing? 2  -PG     Bathing (including washing, rinsing, and drying) 2  -PG     Toileting (which includes using toilet bed pan or urinal) 2  -PG     Putting on and taking off regular upper body clothing 3  -PG     Taking care of personal grooming (such as brushing teeth) 3  -PG     Eating meals 4  -PG     AM-PAC 6 Clicks Score (OT) 16  -PG     Row Name 07/26/22 0838          How much help from another person do you currently need...    Turning from your back to your side while in flat bed without using bedrails? 4  -AP     Moving from lying on back to sitting on the side of a flat bed without bedrails? 4  -AP     Moving to and from a bed to a chair (including a wheelchair)? 4  -AP     Standing up from a chair using your arms (e.g., wheelchair, bedside chair)? 4  -AP     Climbing 3-5 steps with a railing? 3  -AP     To walk in hospital room? 4  -AP     AM-PAC 6 Clicks Score (PT) 23  -AP     Highest level of mobility 7 --> Walked 25 feet or more  -AP     Row Name 07/26/22 0909          Functional Assessment    Outcome Measure Options AM-PAC 6 Clicks Daily Activity (OT);Optimal Instrument  -PG     Row Name 07/26/22 0912 07/26/22 0909       Optimal Instrument    Optimal Instrument Optimal - 3  -PG Optimal - 3  -PG    Bending/Stooping 3  -PG --    Standing 2  -PG --    Reaching 2  -PG --    From the list, choose the 3 activities you would most like to be able to do without any difficulty -- Bending/stooping;Standing  -PG    Total Score Optimal - 3 -- 0  -PG          User Key   (r) = Recorded By, (t) = Taken By, (c) = Cosigned By    Initials Name Provider Type    AP Moises Pathak, RN Registered Nurse    PG Tyrone Rodriguez OT Occupational Therapist                Occupational Therapy Education                 Title: PT OT SLP Therapies (In Progress)     Topic: Occupational Therapy (In Progress)     Point: ADL training (In Progress)     Description:   Instruct learner(s) on proper safety adaptation and remediation techniques during self care or transfers.   Instruct in proper use of assistive devices.              Learning Progress Summary           Patient Acceptance, D, NR by PG at 7/26/2022 0912                   Point: Home exercise program (In Progress)     Description:   Instruct learner(s) on appropriate technique for monitoring, assisting and/or progressing therapeutic exercises/activities.              Learning Progress Summary           Patient Acceptance, D, NR by PG at 7/26/2022 0912                   Point: Precautions (In Progress)     Description:   Instruct learner(s) on prescribed precautions during self-care and functional transfers.              Learning Progress Summary           Patient Acceptance, D, NR by PG at 7/26/2022 0912                   Point: Body mechanics (In Progress)     Description:   Instruct learner(s) on proper positioning and spine alignment during self-care, functional mobility activities and/or exercises.              Learning Progress Summary           Patient Acceptance, D, NR by PG at 7/26/2022 0912                               User Key     Initials Effective Dates Name Provider Type Discipline     06/16/21 -  Tyrone Rodriguez OT Occupational Therapist OT              OT Recommendation and Plan  Planned Therapy Interventions (OT): activity tolerance training, BADL retraining, transfer/mobility retraining, strengthening exercise, patient/caregiver education/training, occupation/activity based interventions  Therapy Frequency (OT): 5 times/wk  Plan  of Care Review  Plan of Care Reviewed With: patient  Progress: no change  Outcome Evaluation: Patient presents with limitations affecting strength, activity tolerance, and balance impacting patient's ability to return home safely and independently.  The skills of a therapist will be required to safely and effectively implement the following treatment plan to restore maximal level of function     Time Calculation:    Time Calculation- OT     Row Name 07/26/22 0915             Time Calculation- OT    OT Received On 07/26/22  -PG      OT Goal Re-Cert Due Date 08/04/22  -PG              Timed Charges    12281 - OT Therapeutic Activity Minutes 8  -PG              Untimed Charges    OT Eval/Re-eval Minutes 30  -PG              Total Minutes    Timed Charges Total Minutes 8  -PG      Untimed Charges Total Minutes 30  -PG       Total Minutes 38  -PG            User Key  (r) = Recorded By, (t) = Taken By, (c) = Cosigned By    Initials Name Provider Type    PG Tyrone Rodriguez OT Occupational Therapist              Therapy Charges for Today     Code Description Service Date Service Provider Modifiers Qty    25734383050  OT EVAL LOW COMPLEXITY 2 7/26/2022 Tyrone Rodriguez OT GO 1    [id number]  OT THERAPEUTIC ACT EA 15 MIN 7/26/2022 Tyrone Rodriguez OT GO 1               Tyrone Rodriguez OT  7/26/2022

## 2022-07-26 NOTE — THERAPY EVALUATION
Acute Care - Physical Therapy Initial Evaluation   Eric     Patient Name: Adarsh Loving  : 1940  MRN: [id number]  Today's Date: 2022      Visit Dx:     ICD-10-CM ICD-9-CM   1. Decreased activities of daily living (ADL)  Z78.9 V49.89   2. Difficulty walking  R26.2 719.7     Patient Active Problem List   Diagnosis   • Abdominal pain     Past Medical History:   Diagnosis Date   • Cancer (HCC)     both kidneys   • Chronic kidney disease    • COPD (chronic obstructive pulmonary disease) (HCC)    • Hypertension    • Sleep apnea      Past Surgical History:   Procedure Laterality Date   • INSERT / REPLACE / REMOVE PACEMAKER     • JOINT REPLACEMENT       PT Assessment (last 12 hours)     PT Evaluation and Treatment     Row Name 22 1300          Physical Therapy Time and Intention    Subjective Information no complaints  -CS     Document Type evaluation  -CS     Mode of Treatment individual therapy;physical therapy  -CS     Patient Effort adequate  -CS     Row Name 22 1300          General Information    Patient Profile Reviewed yes  -CS     Patient Observations alert;cooperative;agree to therapy  -CS     Prior Level of Function independent:;all household mobility;gait;transfer;bed mobility;ADL's  pt independent at home with all mobility and ADLs, uses Rw only for ambulation into bathroom, shower chair for bathing, no O2  -CS     Equipment Currently Used at Home walker, rolling;shower chair  -CS     Existing Precautions/Restrictions fall  -CS     Barriers to Rehab none identified  -CS     Row Name 22 1300          Living Environment    Current Living Arrangements home  -CS     Home Accessibility stairs to enter home;stairs within home  -CS     People in Home spouse  -CS     Primary Care Provided by self  -CS     Row Name 22 1300          Home Main Entrance    Number of Stairs, Main Entrance none  ramp to enter home  -CS     Row Name 22 1300          Stairs Within Home, Primary     Number of Stairs, Within Home, Primary none  -     Row Name 07/26/22 1300          Cognition    Orientation Status (Cognition) oriented x 3  -[facility] Name 07/26/22 1300          Range of Motion Comprehensive    General Range of Motion bilateral lower extremity ROM WFL  -[facility] Name 07/26/22 1300          Strength Comprehensive (MMT)    General Manual Muscle Testing (MMT) Assessment no strength deficits identified  -[facility] Name 07/26/22 1300          Bed Mobility    Bed Mobility bed mobility (all) activities  -     All Activities, [place] (Bed Mobility) modified independence  -     Assistive Device (Bed Mobility) bed rails  -[facility] Name 07/26/22 1300          Transfers    Comment, (Transfers) Patient completing functional transfers independently; took a shower in the bathroom in his room this morning  -[facility] Name 07/26/22 1300          Gait/Stairs (Locomotion)    Comment, (Gait/Stairs) Patient ambulating to/from the bathroom with rolling walker as needed in his room, patient reports no physical therapy needed at this time.  -[facility] Name 07/26/22 1300          Safety Issues, Functional Mobility    Impairments Affecting Function (Mobility) endurance/activity tolerance  -[facility] Name 07/26/22 1300          Plan of Care Review    Plan of Care Reviewed With patient  -     Progress no change  -     Outcome Evaluation Patient reports he is ambulating to and from the bathroom independently using rolling walker as needed or with supervision from wife or nursing staff as needed.  Patient reports not needing any physical therapy at this time.  Patient presents with no physical limitations that would impede his ability to return home with his wife.  Patient will be discharged from physical therapy caseload  -[facility] Name 07/26/22 1300          Therapy Assessment/Plan (PT)    Criteria for Skilled Interventions Met (PT) no problems identified which require skilled intervention  -      Therapy Frequency (PT) evaluation only  -CS     Row Name 07/26/22 1300          PT Evaluation Complexity    History, PT Evaluation Complexity 1-2 personal factors and/or comorbidities  -CS     Examination of Body Systems (PT Eval Complexity) total of 4 or more elements  -CS     Clinical Presentation (PT Evaluation Complexity) stable  -CS     Clinical Decision Making (PT Evaluation Complexity) low complexity  -CS     Overall Complexity (PT Evaluation Complexity) low complexity  -CS     Row Name 07/26/22 1300          Therapy Plan Review/Discharge Plan (PT)    Therapy Plan Review (PT) patient;spouse/significant other;evaluation/treatment results reviewed  -CS           User Key  (r) = Recorded By, (t) = Taken By, (c) = Cosigned By    Initials Name Provider Type    CS Mary Clinton, PT Physical Therapist                  PT Recommendation and Plan  Anticipated Discharge Disposition (PT): home, home with assist, home with home health  Therapy Frequency (PT): evaluation only  Plan of Care Reviewed With: patient  Progress: no change  Outcome Evaluation: Patient reports he is ambulating to and from the bathroom independently using rolling walker as needed or with supervision from wife or nursing staff as needed.  Patient reports not needing any physical therapy at this time.  Patient presents with no physical limitations that would impede his ability to return home with his wife.  Patient will be discharged from physical therapy caseload   Outcome Measures     Row Name 07/26/22 1300             How much help from another person do you currently need...    Turning from your back to your side while in flat bed without using bedrails? 4  -CS      Moving from lying on back to sitting on the side of a flat bed without bedrails? 4  -CS      Moving to and from a bed to a chair (including a wheelchair)? 4  -CS      Standing up from a chair using your arms (e.g., wheelchair, bedside chair)? 4  -CS      Climbing 3-5 steps with a  railing? 3  -CS      To walk in hospital room? 3  -CS      AM-PAC 6 Clicks Score (PT) 22  -CS              Functional Assessment    Outcome Measure Options AM-PAC 6 Clicks Basic Mobility (PT)  -CS            User Key  (r) = Recorded By, (t) = Taken By, (c) = Cosigned By    Initials Name Provider Type    Mary Rendon, PT Physical Therapist                 Time Calculation:    PT Charges     Row Name 07/26/22 1335             Time Calculation    PT Received On 07/26/22  -CS              Untimed Charges    PT Eval/Re-eval Minutes 24  -CS              Total Minutes    Untimed Charges Total Minutes 24  -CS       Total Minutes 24  -CS            User Key  (r) = Recorded By, (t) = Taken By, (c) = Cosigned By    Initials Name Provider Type    Mary Rendon, KATY Physical Therapist              Therapy Charges for Today     Code Description Service Date Service Provider Modifiers Qty    08303484249 HC PT EVAL LOW COMPLEXITY 2 7/26/2022 Mary Clinton, PT GP 1          PT G-Codes  Outcome Measure Options: AM-PAC 6 Clicks Basic Mobility (PT)  AM-PAC 6 Clicks Score (PT): 22  AM-PAC 6 Clicks Score (OT): 16    Mary Clinton, PT  7/26/2022

## 2022-07-26 NOTE — PLAN OF CARE
Goal Outcome Evaluation:  Plan of Care Reviewed With: patient        Progress: no change  Outcome Evaluation: Patient reports he is ambulating to and from the bathroom independently using rolling walker as needed or with supervision from wife or nursing staff as needed.  Patient reports not needing any physical therapy at this time.  Patient presents with no physical limitations that would impede his ability to return home with his wife.  Patient will be discharged from physical therapy caseload

## 2022-07-26 NOTE — PLAN OF CARE
Problem: Adult Inpatient Plan of Care  Goal: Plan of Care Review  Outcome: Met  Goal: Patient-Specific Goal (Individualized)  Outcome: Met  Goal: Absence of Hospital-Acquired Illness or Injury  Outcome: Met  Intervention: Identify and Manage Fall Risk  Recent Flowsheet Documentation  Taken 7/26/2022 1100 by Moises Pathak RN  Safety Promotion/Fall Prevention: safety round/check completed  Taken 7/26/2022 0915 by Moises Pathak RN  Safety Promotion/Fall Prevention: safety round/check completed  Taken 7/26/2022 0838 by Moises Pathak RN  Safety Promotion/Fall Prevention: safety round/check completed  Taken 7/26/2022 0750 by Moises Pathak RN  Safety Promotion/Fall Prevention: safety round/check completed  Intervention: Prevent Infection  Recent Flowsheet Documentation  Taken 7/26/2022 0750 by Moises Pathak RN  Infection Prevention: visitors restricted/screened  Goal: Optimal Comfort and Wellbeing  Outcome: Met  Goal: Readiness for Transition of Care  Outcome: Met     Problem: Hypertension Comorbidity  Goal: Blood Pressure in Desired Range  Outcome: Met     Problem: Pain Acute  Goal: Acceptable Pain Control and Functional Ability  Outcome: Met     Problem: Fall Injury Risk  Goal: Absence of Fall and Fall-Related Injury  Outcome: Met  Intervention: Promote Injury-Free Environment  Recent Flowsheet Documentation  Taken 7/26/2022 1100 by Moises Pathak RN  Safety Promotion/Fall Prevention: safety round/check completed  Taken 7/26/2022 0915 by Moises Pathak RN  Safety Promotion/Fall Prevention: safety round/check completed  Taken 7/26/2022 0838 by Moises Pathak RN  Safety Promotion/Fall Prevention: safety round/check completed  Taken 7/26/2022 0750 by Moises Pathak RN  Safety Promotion/Fall Prevention: safety round/check completed   Goal Outcome Evaluation:

## 2022-07-26 NOTE — PLAN OF CARE
Goal Outcome Evaluation:  Progress: improving  Outcome Evaluation: VSS. Pt has had no complaints of pain this shift. Pt has rested well. Pt ambulated to the restroom with a standby.

## 2022-07-26 NOTE — DISCHARGE SUMMARY
Physician Discharge Summary  Patient Identification  PATIENT IDENTIFICATION    Name: Adarsh Loving  :  1940  MRN: [id number]    Admit date: 2022    Discharge date: 2022     Admitting Physician: Tucker Schwab DO     Discharge Physician: Nhan Smith MD     Admission Diagnoses: Abdominal pain [R10.9]    Hospital Problems:   Principal Problem:  <principal problem not specified>   Active Problems:  Problems Addressed this Visit    None     Visit Diagnoses     Decreased activities of daily living (ADL)    -  Primary      Diagnoses       Codes Comments    Decreased activities of daily living (ADL)    -  Primary ICD-10-CM: Z78.9  ICD-9-CM: V49.89            Discharged Condition: stable    Consults: None    Imaging:   Imaging Results (Last 24 Hours)     ** No results found for the last 24 hours. **          Labs:   Lab Results (last 24 hours)     Procedure Component Value Units Date/Time    Basic Metabolic Panel [[id number]]  (Normal) Collected: 22    Specimen: Blood from Hand, Left Updated: 22     Glucose 93 mg/dL      BUN 15 mg/dL      Creatinine 1.15 mg/dL      Sodium 138 mmol/L      Potassium 3.7 mmol/L      Chloride 107 mmol/L      CO2 22.2 mmol/L      Calcium 8.9 mg/dL      BUN/Creatinine Ratio 13.0     Anion Gap 8.8 mmol/L      eGFR 63.9 mL/min/1.73      Comment: National Kidney Foundation and American Society of Nephrology (ASN) Task Force recommended calculation based on the Chronic Kidney Disease Epidemiology Collaboration (CKD-EPI) equation refit without adjustment for race.       Narrative:      GFR Normal >60  Chronic Kidney Disease <60  Kidney Failure <15      CBC (No Diff) [[id number]]  (Abnormal) Collected: 22    Specimen: Blood from Hand, Left Updated: 22     WBC 3.50 10*3/mm3      RBC 3.65 10*6/mm3      Hemoglobin 10.4 g/dL      Hematocrit 31.8 %      MCV 87.1 fL      MCH 28.5 pg      MCHC 32.7 g/dL      RDW 13.2 %      RDW-SD 42.5  fl      MPV 9.9 fL      Platelets 157 10*3/mm3             Hospital Course:   81 y/o male with a h/o HTN, chronic diastolic HF, CKD3, low grade bilateral renal cell carcinoma who presents as a transfer from [facility] for possible ileus with significant stool burden. He was given enema here and had 2 BMs the day prior to d/c. He was tolerating po intake with FLD. His Cr returned to his baseline with IVF. His BP was low-normal so he was advised to hold his minoxidil, lasix, minipress and losaratn for another 2 days and then recheck BP and if >140/80 he could resume these. He will follow up with his PCP in 1-2 weeks. He is otherwise stable for d/c.     He was tested for covid in [facility] and was found to be positive. He had mild cough but no sob and not hypoxic so no treatments given here. He was advised to monitor his symptoms and if he becomes more sob, has fevers, or is coughing up more phlegm he will call his PCP's office.     Discharge Exam:  Gen: up in bed, in NAD  CV:  RRR, no m/r/g, no peripheral edema  Resp: CTAB, no increase work of breathing  Abd: soft, NT, ND, bs present  Neuro: moves all 4 ext, following commands  Ext: no clubbing, cyanosis or edema  Psych: AAOx2, pleasant affect    Disposition: Home    Patient Discharge Medications:      Discharge Medications      New Medications      Instructions Start Date   polyethylene glycol 17 g packet  Commonly known as: MIRALAX  Replaces: polyethylene glycol 17 GM/SCOOP powder   17 g, Oral, Daily         Continue These Medications      Instructions Start Date   cholecalciferol 25 MCG (1000 UT) tablet  Commonly known as: VITAMIN D3   800 Units, Oral, Daily      cyproheptadine 4 MG tablet  Commonly known as: PERIACTIN   4 mg, Oral, 3 Times Daily      furosemide 40 MG tablet  Commonly known as: LASIX   40 mg, Daily      levothyroxine 50 MCG tablet  Commonly known as: SYNTHROID, LEVOTHROID   50 mcg, Oral, Every Early Morning      losartan  100 MG tablet  Commonly known as: COZAAR   100 mg, Daily      metoprolol tartrate 50 MG tablet  Commonly known as: LOPRESSOR   25 mg, Oral, 2 times daily      minoxidil 10 MG tablet  Commonly known as: LONITEN   10 mg, 2 times daily      mirtazapine 30 MG tablet  Commonly known as: REMERON   30 mg, Daily, bedtime      pantoprazole 40 MG EC tablet  Commonly known as: PROTONIX   40 mg, Oral, Daily      prazosin 2 MG capsule  Commonly known as: MINIPRESS   2 mg, Oral, Nightly      vitamin B-12 1000 MCG tablet  Commonly known as: CYANOCOBALAMIN   1,000 mcg, Oral, Daily         Stop These Medications    polyethylene glycol 17 GM/SCOOP powder  Commonly known as: MIRALAX  Replaced by: polyethylene glycol 17 g packet           Follow-up Information     Alf Moon MD .    Specialty: Internal Medicine  Contact information:  [address] [place] [id number] [phone]                             Signed:  Umair Smith MD  [facility]ospitalist [facility]  7/26/2022  12:38 EDT      I spent 24 minutes arranging and coordinating discharge and reviewing medications with majority of time spent counseling patient

## 2022-07-27 ENCOUNTER — READMISSION MANAGEMENT (OUTPATIENT)
Dept: CALL CENTER | Facility: HOSPITAL | Age: 82
End: 2022-07-27

## 2022-07-27 NOTE — OUTREACH NOTE
Prep Survey    Flowsheet Row Responses   [facility] discharged [facility]? [facility]   Is LACE score < 7 ? Yes   Emergency Room discharge w/ pulse ox? No   Eligibility Not Eligible  [low risk for readmit]   What are the reasons patient is not eligible? Other   Does the patient have one of the following disease processes/diagnoses(primary or secondary)? Other   Prep survey completed? Yes          ANIL MEYER - Registered Nurse

## 2022-07-28 ENCOUNTER — NURSE TRIAGE (OUTPATIENT)
Dept: CALL CENTER | Facility: HOSPITAL | Age: 82
End: 2022-07-28

## 2022-07-28 NOTE — OUTREACH NOTE
[facility] and medication was shipped out to patient today 7/28. RN CM contacted patient wife to notify her that [facility]. No other concerns voiced.

## 2022-07-28 NOTE — TELEPHONE ENCOUNTER
He was discharged from [facility] on 07/25/2022- The script   Time Zone Mismatch    Time-sensitive data might be out of sequence or missing. Information for this patient was recently documented in a time zone different from the current session’s time zone. To edit documentation and ensure all information is up to date, log in from the patient’s time zone.   Patient's time zone: [place] Current session's time zone: [facility]     polyethylene glycol (MIRALAX) 17 g packet [[id number]] (Order [id number])  Order  Date: 7/26/2022 Department: [address] Ordering/Authorizing: Nhan Smith MD     Medication  polyethylene glycol (MIRALAX) 17 g packet [[id number]]    Order History  Outpatient  Date/Time Action Taken User Additional Information   07/26/22 1237 Sign Nhan Smith MD Reorder from Order: [id number]   07/26/22 1237 Taking Flag Checked Nhan Smith MD [id number]     Outpatient Morphine Milligram Equivalents Per Day  Expand All  Collapse All  No orders with morphine equivalence     polyethylene glycol (MIRALAX) 17 g packet [[id number]]      [facility]- [phone]- The Miralax that he needs is hte same dosage that he has. Explained it was ordered in packets, and that is the same as the scoops.     Reason for Disposition  • [1] Prescription not at pharmacy AND [2] was prescribed by PCP recently (Exception: triager has access to EMR and prescription is recorded there. Go to Home Care and confirm for pharmacy.)    Additional Information  • Negative: [1] Intentional drug overdose AND [2] suicidal thoughts or ideas  • Negative: Drug overdose and triager unable to answer question  • Negative: Caller requesting information unrelated to medicine  • Negative: Caller requesting information about COVID-19 Vaccine  • Negative: Caller requesting information about Emergency Contraception  • Negative: Caller requesting information about Combined  "Birth Control Pills  • Negative: Caller requesting information about Progestin Birth Control Pills  • Negative: Caller requesting information about Post-Op pain or medicines  • Negative: Caller requesting a prescription antibiotic (such as Penicillin) for Strep throat and has a positive culture result  • Negative: Caller requesting a prescription anti-viral med (such as Tamiflu) and has influenza (flu)  symptoms  • Negative: Immunization reaction suspected  • Negative: Rash while taking a medicine or within 3 days of stopping it  • Negative: [1] Asthma and [2] having symptoms of asthma (cough, wheezing, etc.)  • Negative: [1] Symptom of illness (e.g., headache, abdominal pain, earache, vomiting) AND [2] more than mild  • Negative: Breastfeeding questions about mother's medicines and diet  • Negative: MORE THAN A DOUBLE DOSE of a prescription or over-the-counter (OTC) drug  • Negative: [1] DOUBLE DOSE (an extra dose or lesser amount) of prescription drug AND [2] any symptoms (e.g., dizziness, nausea, pain, sleepiness)  • Negative: [1] DOUBLE DOSE (an extra dose or lesser amount) of over-the-counter (OTC) drug AND [2] any symptoms (e.g., dizziness, nausea, pain, sleepiness)  • Negative: Took another person's prescription drug  • Negative: [1] DOUBLE DOSE (an extra dose or lesser amount) of prescription drug AND [2] NO symptoms (Exception: a double dose of antibiotics)  • Negative: Diabetes drug error or overdose (e.g., took wrong type of insulin or took extra dose)  • Negative: [1] Prescription refill request for ESSENTIAL medicine (i.e., likelihood of harm to patient if not taken) AND [2] triager unable to refill per department policy  • Negative: [1] Pharmacy calling with prescription question AND [2] triager unable to answer question    Answer Assessment - Initial Assessment Questions  1. NAME of MEDICATION: \"What medicine are you calling about?\"      Mirialx   2. QUESTION: \"What is your question?\" (e.g., " "medication refill, side effect)      [facility]   3. PRESCRIBING HCP: \"Who prescribed it?\" Reason: if prescribed by specialist, call should be referred to that group.      Hospital service.   4. SYMPTOMS: \"Do you have any symptoms?\"      none  5. SEVERITY: If symptoms are present, ask \"Are they mild, moderate or severe?\"      n  6. PREGNANCY:  \"Is there any chance that you are pregnant?\" \"When was your last menstrual period?\"      n    Protocols used: MEDICATION QUESTION CALL-ADULT-      "

## 2022-10-12 ENCOUNTER — DOCUMENTATION (OUTPATIENT)
Dept: CARDIOLOGY | Facility: CLINIC | Age: 82
End: 2022-10-12

## 2022-10-19 ENCOUNTER — OFFICE VISIT (OUTPATIENT)
Dept: CARDIOLOGY | Facility: CLINIC | Age: 82
End: 2022-10-19

## 2022-10-19 ENCOUNTER — CLINICAL SUPPORT NO REQUIREMENTS (OUTPATIENT)
Dept: CARDIOLOGY | Facility: CLINIC | Age: 82
End: 2022-10-19

## 2022-10-19 VITALS
HEART RATE: 60 BPM | SYSTOLIC BLOOD PRESSURE: 159 MMHG | WEIGHT: 216 LBS | BODY MASS INDEX: 29.26 KG/M2 | HEIGHT: 72 IN | DIASTOLIC BLOOD PRESSURE: 104 MMHG

## 2022-10-19 DIAGNOSIS — Z95.0 PRESENCE OF CARDIAC PACEMAKER: ICD-10-CM

## 2022-10-19 DIAGNOSIS — I44.2 CHB (COMPLETE HEART BLOCK): Primary | ICD-10-CM

## 2022-10-19 DIAGNOSIS — I49.5 SSS (SICK SINUS SYNDROME): Primary | ICD-10-CM

## 2022-10-19 DIAGNOSIS — I44.2 CHB (COMPLETE HEART BLOCK): ICD-10-CM

## 2022-10-19 DIAGNOSIS — I10 PRIMARY HYPERTENSION: ICD-10-CM

## 2022-10-19 PROCEDURE — 99214 OFFICE O/P EST MOD 30 MIN: CPT | Performed by: INTERNAL MEDICINE

## 2022-10-19 PROCEDURE — 93280 PM DEVICE PROGR EVAL DUAL: CPT | Performed by: INTERNAL MEDICINE

## 2022-10-19 NOTE — PROGRESS NOTES
Normal Dual Chamber Pacemaker Device Interrogation and Device Testing.  Normal evaluation of device function and lead measurements.  No optimization was needed of parameters or maximization of device longevity.  Patient is on automated Home Remote Monitoring. I have reached out to [employer] services to evaluate the battery status and get an accurate projected time frame of longevity.

## 2022-10-19 NOTE — PROGRESS NOTES
Chief Complaint  Complete Heart Block, Hypertension, and Pacemaker Check    Subjective            Adarsh Loving presents to [facility] CARDIOLOG[facility]  History of Present Illness    Mr. Loving is here for follow-up evaluation management of complete heart block, permanent pacemaker, hypertension.  Since last visit he has been doing relatively well.  He does have occasional falls.  He denies palpitations or chest pain.  He is compliant with his medical therapy.  He has apparently been diagnosed with a form of kidney cancer, he is declining further treatment according to his wife.    PMH  Past Medical History:   Diagnosis Date   • Cancer (HCC)     both kidneys   • Chronic kidney disease    • COPD (chronic obstructive pulmonary disease) (HCC)    • Hypertension    • Sleep apnea          SURGICALHX  Past Surgical History:   Procedure Laterality Date   • INSERT / REPLACE / REMOVE PACEMAKER     • JOINT REPLACEMENT          SOC  Social History     Socioeconomic History   • Marital status:    Tobacco Use   • Smoking status: Never   • Smokeless tobacco: Never   Vaping Use   • Vaping Use: Never used   Substance and Sexual Activity   • Alcohol use: Never   • Drug use: Never   • Sexual activity: Defer         FAMHX  Family History   Problem Relation Age of Onset   • Hypertension Father           ALLERGY  Allergies   Allergen Reactions   • Amlodipine Unknown - Low Severity   • Lisinopril Unknown - Low Severity   • Niacin Unknown - Low Severity   • Omeprazole Unknown - Low Severity        MEDSCURRENT    Current Outpatient Medications:   •  cholecalciferol (VITAMIN D3) 25 MCG (1000 UT) tablet, Take 800 Units by mouth Daily., Disp: , Rfl:   •  furosemide (LASIX) 40 MG tablet, 40 mg Daily., Disp: , Rfl:   •  levothyroxine (SYNTHROID, LEVOTHROID) 50 MCG tablet, Take 50 mcg by mouth Every Morning., Disp: , Rfl:   •  losartan (COZAAR) 100 MG tablet, 100 mg Daily., Disp: , Rfl:   •  metoprolol tartrate (LOPRESSOR) 50  "MG tablet, Take 25 mg by mouth 2 (two) times a day., Disp: , Rfl:   •  mirtazapine (REMERON) 30 MG tablet, 30 mg Daily. bedtime, Disp: , Rfl:   •  pantoprazole (PROTONIX) 40 MG EC tablet, Take 40 mg by mouth Daily., Disp: , Rfl:   •  vitamin B-12 (CYANOCOBALAMIN) 1000 MCG tablet, Take 1,000 mcg by mouth Daily., Disp: , Rfl:   •  cyproheptadine (PERIACTIN) 4 MG tablet, Take 4 mg by mouth 3 (Three) Times a Day., Disp: , Rfl:   •  minoxidil (LONITEN) 10 MG tablet, 10 mg 2 (two) times a day., Disp: , Rfl:   •  prazosin (MINIPRESS) 2 MG capsule, Take 2 mg by mouth Every Night., Disp: , Rfl:       Review of Systems   Cardiovascular: Negative for chest pain and dyspnea on exertion.   Musculoskeletal: Positive for falls.        Objective     BP (!) 159/104   Pulse 60   Ht 182.9 cm (72\")   Wt 98 kg (216 lb)   BMI 29.29 kg/m²       General Appearance:   · well developed  · well nourished  HENT:   · oropharynx moist  · lips not cyanotic  Neck:  · thyroid not enlarged  · supple  Respiratory:  · no respiratory distress  · normal breath sounds  · no rales  Cardiovascular:  · no jugular venous distention  · regular rhythm  · apical impulse normal  · S1 normal, S2 normal  · no S3, no S4   · no murmur  · no rub, no thrill  · carotid pulses normal; no bruit  · pedal pulses normal  · lower extremity edema: none    Musculoskeletal:  · no clubbing of fingers.   · normocephalic, head atraumatic  Skin:   · warm, dry  Psychiatric:  · judgement and insight appropriate  · normal mood and affect      Result Review :     The following data was reviewed by: González Newman MD on 10/19/2022:    CMP    CMP 7/25/22 7/25/22 7/26/22    0243 [id number]    Glucose 97 89 93   BUN 21 20 15   Creatinine 1.42 (A) 1.23 1.15   Sodium 141 140 138   Potassium 4.2 3.8 3.7   Chloride 107 107 107   Calcium 9.1 9.1 8.9   Albumin 3.70     Total Bilirubin 0.9     Alkaline Phosphatase 112     AST (SGOT) 14     ALT (SGPT) 5     (A) Abnormal value        "     CBC    CBC 7/25/22 7/26/22   WBC 3.58 3.50   RBC 3.80 (A) 3.65 (A)   Hemoglobin 10.9 (A) 10.4 (A)   Hematocrit 33.7 (A) 31.8 (A)   MCV 88.7 87.1   MCH 28.7 28.5   MCHC 32.3 32.7   RDW 13.8 13.2   Platelets 162 157   (A) Abnormal value                TSH    TSH 7/25/22   TSH 3.370             Data reviewed: Primary care records reviewed pacemaker interrogation today shows normal device function, 99% ventricular pacing, estimated battery life 2.4 years.  For unclear reasons the most recent remote monitoring evaluation stated 11 months left on the battery, prior to that 3.0 years.    Procedures             Assessment and Plan        ASSESSMENT:  Encounter Diagnoses   Name Primary?   • SSS (sick sinus syndrome) (HCC) Yes   • CHB (complete heart block) (HCC)    • Primary hypertension          PLAN:    1.  Sick sinus syndrome, complete heart block with permanent pacemaker.  We will check with technical services to see if the patient has an advisory device for erratic battery behavior.  We will continue remote monitoring, and routine follow-up otherwise  2.  Primary hypertension, uncontrolled, PCP recently made medication changes, follow-up as scheduled            Patient was given instructions and counseling regarding his condition or for health maintenance advice. Please see specific information pulled into the AVS if appropriate.             DENISE Newman MD  10/19/2022    12:48 EDT

## 2023-01-06 ENCOUNTER — TELEPHONE (OUTPATIENT)
Dept: CARDIOLOGY | Facility: CLINIC | Age: 83
End: 2023-01-06
Payer: OTHER GOVERNMENT

## 2023-01-06 NOTE — TELEPHONE ENCOUNTER
New onset of Atrial Fibrillation. He is 82 years old and not currently taking anticoagulation, I do not see a history of Atrial Fibrillation or previous interrogation.  Mr. Loving relocated here with daughter approximately 7-8 months ago.    I want to also note that when he was in office for device interrogation and follow up on 10/20/21 that the  software had not been updated from the device company.  The interrogation is scanned into the media tab, it stated that he had 2.4 YEARS on battery, however on home remotes he has 6.7 MONTHS.  I spoke to technical services and the 6.7 MONTHS is the accurate reading.      When reviewing the diagnostic tab and looking for previous Atrial Fib the graph is flat, but now it is elevated.  Full report will upload from [facility].

## 2023-01-06 NOTE — TELEPHONE ENCOUNTER
I tried to call the patient but he did not answer.  Given the new onset of atrial fibrillation I want to start the patient on anticoagulation to reduce the risk that he would have a stroke.  I have not been able to speak with the patient to know if he is symptomatic, but regardless of whether he is symptomatic or not I recommend he be started on anticoagulation.  I am ordering Eliquis to his pharmacy.  Please try to get in touch with the patient as soon as possible to let him know.  If he is otherwise feeling well we will just keep an eye on this along with starting the blood thinner.  If he is feeling specific symptoms such as more significant shortness of breath, palpitations, or marked fatigue out of the ordinary then earlier follow-up would need to be arranged with APRN in the office

## 2023-01-09 NOTE — TELEPHONE ENCOUNTER
Attempted to call patient. No answer. Will send letter to have patient call office. There are no other contacts listed to be able to reach patient

## 2023-01-12 NOTE — TELEPHONE ENCOUNTER
[facility] the [facility]. [facility] has the same number on file with no other contacts. I have attempted multiple times to reach patient and daughter. No answer. Will continue to try and make contact. I did mail a letter Monday to have them call.

## 2023-01-12 NOTE — TELEPHONE ENCOUNTER
I have tried to call Mr. Loving and there is just a busy signal, I will keep trying and I will see if he has a PCP and see if they have another number or family member listed.

## 2023-01-16 NOTE — TELEPHONE ENCOUNTER
JARED patient and wife. Voiced understanding. Patient wife states patient has been in hospital at [facility] for pneumonia.Patient came home last Wednesday. Patient denies any issues at this time.    Patient has not received Eliquis yet. [facility]. Patient should be getting it tomorrow.     Alternative numbers placed in patient contacts

## 2023-09-12 ENCOUNTER — PREP FOR SURGERY (OUTPATIENT)
Dept: OTHER | Facility: HOSPITAL | Age: 83
End: 2023-09-12
Payer: OTHER GOVERNMENT

## 2023-09-12 ENCOUNTER — TELEPHONE (OUTPATIENT)
Dept: CARDIOLOGY | Facility: CLINIC | Age: 83
End: 2023-09-12
Payer: OTHER GOVERNMENT

## 2023-09-12 DIAGNOSIS — Z45.010 PACEMAKER BATTERY DEPLETION: Primary | ICD-10-CM

## 2023-09-12 RX ORDER — SODIUM CHLORIDE 0.9 % (FLUSH) 0.9 %
10 SYRINGE (ML) INJECTION AS NEEDED
OUTPATIENT
Start: 2023-09-12

## 2023-09-12 RX ORDER — SODIUM CHLORIDE 9 MG/ML
40 INJECTION, SOLUTION INTRAVENOUS AS NEEDED
OUTPATIENT
Start: 2023-09-12

## 2023-09-12 RX ORDER — SODIUM CHLORIDE 0.9 % (FLUSH) 0.9 %
10 SYRINGE (ML) INJECTION EVERY 12 HOURS SCHEDULED
OUTPATIENT
Start: 2023-09-12

## 2023-09-12 NOTE — TELEPHONE ENCOUNTER
I put in orders, we will schedule pacemaker battery replacement on 9/21.  Please have the patient hold his Eliquis on Tuesday, Wednesday, Thursday of that week  I want to start this case at 8:00 sharp, with [employer], please have the patient arrive accordingly so that he is ready for an 8:00 case.    González Newman MD

## 2023-09-12 NOTE — TELEPHONE ENCOUNTER
Patient has a [employer] Dual chamber pacemaker that has reached MONTY.    I spoke with his wife Kylie and she is aware, please contact her @ [phone].  There are several numbers listed that do not have voicemail and home phone only works sometime.

## 2023-09-13 PROBLEM — Z45.010 PACEMAKER BATTERY DEPLETION: Status: ACTIVE | Noted: 2023-09-13

## 2023-09-14 ENCOUNTER — TELEPHONE (OUTPATIENT)
Dept: CARDIOLOGY | Facility: CLINIC | Age: 83
End: 2023-09-14
Payer: OTHER GOVERNMENT

## 2023-09-14 NOTE — TELEPHONE ENCOUNTER
I spoke to patient and gave an arrival time of 7:30 on 09/21/23 for [employer] single chamber pacemaker battery change. Patient was instructed to have a  for the day of the procedure and to arrive at the main entrance/registration area. Patient was instructed to hold Eliquis for 48 hours prior to procedure. Patient was instructed to continue all other medications as usual. Patient was instructed to be NPO after midnight with sips of water as needed. Patient is agreeable with no other questions or concerns.

## 2023-09-19 RX ORDER — SUCRALFATE 1 G/1
1 TABLET ORAL 4 TIMES DAILY
COMMUNITY

## 2023-09-19 RX ORDER — AMLODIPINE BESYLATE 10 MG/1
10 TABLET ORAL DAILY
COMMUNITY

## 2023-09-19 NOTE — PRE-PROCEDURE INSTRUCTIONS
PATIENT INSTRUCTED TO BE:    - NPO AFTER MIDNIGHT EXCEPT CAN HAVE SIPS OF WATER WITH HIS MEDICATION PRIOR TO PROCEDURE    -  INSTRUCTED NO LOTIONS, JEWELRY, PIERCINGS, OR DEODORANT DAY OF THE PROCEDURE    - INSTRUCTED TO TAKE THE FOLLOWING MEDICATIONS THE DAY OF SURGERY: AMLODIPINE, VITAMIN D3, LASIX, LEVOTHYROXINE, LOSARTAN, METOPROLOL, PROTONIX, SUCRALFATE           - INSTRUCTED PT TO FOLLOW ANY INSTRUCTIONS GIVEN BY HIS CARDIOLOGIST.      - INFORMED THE PATIENT HE CAN HAVE TWO VISITORS WITH HIM THE DAY OF THE PROCEDURE    - INSTRUCTED PT TO PARK IN THE PARKING GARAGE, ENTER THE HOSPITAL THRU ENTRANCE A AND  CHECK IN AT THE MAIN REGISTRATION DESK, AFTER REGISTRATION PT WILL BE TAKEN THE THE PREOP AREA FOR HIS PROCEDURE.    -ARRIVAL TIME GIVEN BY CARDIOLOGIST OFFICE, INFORMED PT IF ARRIVAL TIME CHANGES OR ADJUSTMENTS NEED TO BE MADE IN THEIR ARRIVAL TIME, HE/SHE WOULD RECEIVE A CALL.      - PATIENT VERBALIZED UNDERSTANDING

## 2023-09-21 ENCOUNTER — HOSPITAL ENCOUNTER (OUTPATIENT)
Facility: HOSPITAL | Age: 83
Setting detail: HOSPITAL OUTPATIENT SURGERY
Discharge: HOME | End: 2023-09-21
Attending: INTERNAL MEDICINE | Admitting: INTERNAL MEDICINE
Payer: OTHER GOVERNMENT

## 2023-09-21 VITALS
SYSTOLIC BLOOD PRESSURE: 141 MMHG | HEIGHT: 72 IN | HEART RATE: 65 BPM | OXYGEN SATURATION: 99 % | BODY MASS INDEX: 27.09 KG/M2 | DIASTOLIC BLOOD PRESSURE: 98 MMHG | RESPIRATION RATE: 16 BRPM | WEIGHT: 200 LBS

## 2023-09-21 DIAGNOSIS — Z45.010 PACEMAKER BATTERY DEPLETION: ICD-10-CM

## 2023-09-21 LAB
ANION GAP SERPL CALCULATED.3IONS-SCNC: 9.2 MMOL/L (ref 5–15)
BUN SERPL-MCNC: 18 MG/DL (ref 8–23)
BUN/CREAT SERPL: 12.8 (ref 7–25)
CALCIUM SPEC-SCNC: 9.2 MG/DL (ref 8.6–10.5)
CHLORIDE SERPL-SCNC: 104 MMOL/L (ref 98–107)
CO2 SERPL-SCNC: 27.8 MMOL/L (ref 22–29)
CREAT SERPL-MCNC: 1.41 MG/DL (ref 0.76–1.27)
DEPRECATED RDW RBC AUTO: 56.9 FL (ref 37–54)
EGFRCR SERPLBLD CKD-EPI 2021: 49.8 ML/MIN/1.73
ERYTHROCYTE [DISTWIDTH] IN BLOOD BY AUTOMATED COUNT: 17.2 % (ref 12.3–15.4)
GLUCOSE SERPL-MCNC: 102 MG/DL (ref 65–99)
HCT VFR BLD AUTO: 41 % (ref 37.5–51)
HGB BLD-MCNC: 12.5 G/DL (ref 13–17.7)
INR PPP: 1.11 (ref 0.86–1.15)
MCH RBC QN AUTO: 29.3 PG (ref 26.6–33)
MCHC RBC AUTO-ENTMCNC: 30.5 G/DL (ref 31.5–35.7)
MCV RBC AUTO: 96.2 FL (ref 79–97)
PLATELET # BLD AUTO: 190 10*3/MM3 (ref 140–450)
PMV BLD AUTO: 10.5 FL (ref 6–12)
POTASSIUM SERPL-SCNC: 3.5 MMOL/L (ref 3.5–5.2)
PROTHROMBIN TIME: 14.4 SECONDS (ref 11.8–14.9)
RBC # BLD AUTO: 4.26 10*6/MM3 (ref 4.14–5.8)
SODIUM SERPL-SCNC: 141 MMOL/L (ref 136–145)
WBC NRBC COR # BLD: 3.67 10*3/MM3 (ref 3.4–10.8)

## 2023-09-21 PROCEDURE — 25010000002 FENTANYL CITRATE (PF) 50 MCG/ML SOLUTION: Performed by: INTERNAL MEDICINE

## 2023-09-21 PROCEDURE — 33228 REMV&REPLC PM GEN DUAL LEAD: CPT | Performed by: INTERNAL MEDICINE

## 2023-09-21 PROCEDURE — 25010000002 BUPIVACAINE (PF) 0.5 % SOLUTION: Performed by: INTERNAL MEDICINE

## 2023-09-21 PROCEDURE — C1785 PMKR, DUAL, RATE-RESP: HCPCS | Performed by: INTERNAL MEDICINE

## 2023-09-21 PROCEDURE — 85027 COMPLETE CBC AUTOMATED: CPT | Performed by: INTERNAL MEDICINE

## 2023-09-21 PROCEDURE — 25010000002 MIDAZOLAM PER 1MG: Performed by: INTERNAL MEDICINE

## 2023-09-21 PROCEDURE — 85610 PROTHROMBIN TIME: CPT | Performed by: INTERNAL MEDICINE

## 2023-09-21 PROCEDURE — 25010000002 CEFAZOLIN IN DEXTROSE 2000 MG/ 100 ML SOLUTION: Performed by: INTERNAL MEDICINE

## 2023-09-21 PROCEDURE — 80048 BASIC METABOLIC PNL TOTAL CA: CPT | Performed by: INTERNAL MEDICINE

## 2023-09-21 DEVICE — PACEMAKER
Type: IMPLANTABLE DEVICE | Status: FUNCTIONAL
Brand: ACCOLADE™ MRI EL DR

## 2023-09-21 RX ORDER — BUPIVACAINE HYDROCHLORIDE 5 MG/ML
INJECTION, SOLUTION EPIDURAL; INTRACAUDAL
Status: DISCONTINUED | OUTPATIENT
Start: 2023-09-21 | End: 2023-09-21 | Stop reason: HOSPADM

## 2023-09-21 RX ORDER — SODIUM CHLORIDE 9 MG/ML
40 INJECTION, SOLUTION INTRAVENOUS AS NEEDED
Status: DISCONTINUED | OUTPATIENT
Start: 2023-09-21 | End: 2023-09-21 | Stop reason: HOSPADM

## 2023-09-21 RX ORDER — SODIUM CHLORIDE 0.9 % (FLUSH) 0.9 %
10 SYRINGE (ML) INJECTION AS NEEDED
Status: DISCONTINUED | OUTPATIENT
Start: 2023-09-21 | End: 2023-09-21 | Stop reason: HOSPADM

## 2023-09-21 RX ORDER — LIDOCAINE HYDROCHLORIDE 20 MG/ML
INJECTION, SOLUTION INFILTRATION; PERINEURAL
Status: DISCONTINUED | OUTPATIENT
Start: 2023-09-21 | End: 2023-09-21 | Stop reason: HOSPADM

## 2023-09-21 RX ORDER — FENTANYL CITRATE 50 UG/ML
INJECTION, SOLUTION INTRAMUSCULAR; INTRAVENOUS
Status: DISCONTINUED | OUTPATIENT
Start: 2023-09-21 | End: 2023-09-21 | Stop reason: HOSPADM

## 2023-09-21 RX ORDER — SODIUM CHLORIDE 0.9 % (FLUSH) 0.9 %
10 SYRINGE (ML) INJECTION EVERY 12 HOURS SCHEDULED
Status: DISCONTINUED | OUTPATIENT
Start: 2023-09-21 | End: 2023-09-21 | Stop reason: HOSPADM

## 2023-09-21 RX ORDER — CEFAZOLIN SODIUM 2 G/100ML
2000 INJECTION, SOLUTION INTRAVENOUS ONCE
Status: COMPLETED | OUTPATIENT
Start: 2023-09-21 | End: 2023-09-21

## 2023-09-21 RX ORDER — MIDAZOLAM HYDROCHLORIDE 2 MG/2ML
INJECTION, SOLUTION INTRAMUSCULAR; INTRAVENOUS
Status: DISCONTINUED | OUTPATIENT
Start: 2023-09-21 | End: 2023-09-21 | Stop reason: HOSPADM

## 2023-09-21 RX ADMIN — CEFAZOLIN SODIUM 2000 MG: 2 INJECTION, SOLUTION INTRAVENOUS at 09:17

## 2023-09-21 NOTE — H&P
Chief Complaint  Complete Heart Block, Hypertension, and Pacemaker Check    Subjective            Adarsh Loving presents to [facility] CARDIOLOGY  History of Present Illness    Mr. Loving is here for follow-up evaluation management of complete heart block, permanent pacemaker, hypertension.  Recently on remote monitoring his device has reached elective replacement voltage.  He is here for elective generator replacement.  No new clinical history    PMH  Past Medical History:   Diagnosis Date    Cancer (HCC)     both kidneys    Chronic kidney disease     COPD (chronic obstructive pulmonary disease) (HCC)     Hypertension     Sleep apnea          SURGICALHX  Past Surgical History:   Procedure Laterality Date    INSERT / REPLACE / REMOVE PACEMAKER      JOINT REPLACEMENT          SOC  Social History     Socioeconomic History    Marital status:    Tobacco Use    Smoking status: Never    Smokeless tobacco: Never   Vaping Use    Vaping Use: Never used   Substance and Sexual Activity    Alcohol use: Never    Drug use: Never    Sexual activity: Defer         FAMHX  Family History   Problem Relation Age of Onset    Hypertension Father           ALLERGY  Allergies   Allergen Reactions    Amlodipine Unknown - Low Severity    Lisinopril Unknown - Low Severity    Niacin Unknown - Low Severity    Omeprazole Unknown - Low Severity        MEDSCURRENT    Current Outpatient Medications:     cholecalciferol (VITAMIN D3) 25 MCG (1000 UT) tablet, Take 800 Units by mouth Daily., Disp: , Rfl:     furosemide (LASIX) 40 MG tablet, 40 mg Daily., Disp: , Rfl:     levothyroxine (SYNTHROID, LEVOTHROID) 50 MCG tablet, Take 50 mcg by mouth Every Morning., Disp: , Rfl:     losartan (COZAAR) 100 MG tablet, 100 mg Daily., Disp: , Rfl:     metoprolol tartrate (LOPRESSOR) 50 MG tablet, Take 25 mg by mouth 2 (two) times a day., Disp: , Rfl:     mirtazapine (REMERON) 30 MG tablet, 30 mg Daily. bedtime, Disp: , Rfl:      "pantoprazole (PROTONIX) 40 MG EC tablet, Take 40 mg by mouth Daily., Disp: , Rfl:     vitamin B-12 (CYANOCOBALAMIN) 1000 MCG tablet, Take 1,000 mcg by mouth Daily., Disp: , Rfl:     cyproheptadine (PERIACTIN) 4 MG tablet, Take 4 mg by mouth 3 (Three) Times a Day., Disp: , Rfl:     minoxidil (LONITEN) 10 MG tablet, 10 mg 2 (two) times a day., Disp: , Rfl:     prazosin (MINIPRESS) 2 MG capsule, Take 2 mg by mouth Every Night., Disp: , Rfl:       Review of Systems   Cardiovascular:  Negative for chest pain and dyspnea on exertion.      Objective     BP (!) 159/104   Pulse 60   Ht 182.9 cm (72\")   Wt 98 kg (216 lb)   BMI 29.29 kg/m²       General Appearance:   well developed  well nourished  HENT:   oropharynx moist  lips not cyanotic  Neck:  thyroid not enlarged  supple  Respiratory:  no respiratory distress  normal breath sounds  no rales  Cardiovascular:  no jugular venous distention  regular rhythm  apical impulse normal  S1 normal, S2 normal  no S3, no S4   no murmur  no rub, no thrill  carotid pulses normal; no bruit  pedal pulses normal  lower extremity edema: none        Result Review :     The following data was reviewed by: González Newman MD on 10/19/2022:    CMP      CMP 7/25/22 7/25/22 7/26/22    0243 0634    Glucose 97 89 93   BUN 21 20 15   Creatinine 1.42 (A) 1.23 1.15   Sodium 141 140 138   Potassium 4.2 3.8 3.7   Chloride 107 107 107   Calcium 9.1 9.1 8.9   Albumin 3.70     Total Bilirubin 0.9     Alkaline Phosphatase 112     AST (SGOT) 14     ALT (SGPT) 5     (A) Abnormal value              CBC      CBC 7/25/22 7/26/22   WBC 3.58 3.50   RBC 3.80 (A) 3.65 (A)   Hemoglobin 10.9 (A) 10.4 (A)   Hematocrit 33.7 (A) 31.8 (A)   MCV 88.7 87.1   MCH 28.7 28.5   MCHC 32.3 32.7   RDW 13.8 13.2   Platelets 162 157   (A) Abnormal value                  TSH      TSH 7/25/22   TSH 3.370               Data reviewed : Pacemaker monitoring shows the device is functioning normally but at elective " replacement voltage      Procedures             Assessment and Plan        ASSESSMENT:  Encounter Diagnoses   Name Primary?    SSS (sick sinus syndrome) (HCC) Yes    CHB (complete heart block) (HCC)     Primary hypertension          PLAN:    1.  Sick sinus syndrome, complete heart block with permanent pacemaker.  The device is at elective replacement voltage.  Here for elective generator change.  Risks and benefits discussed, he is agreeable to proceed              Patient was given instructions and counseling regarding his condition or for health maintenance advice. Please see specific information pulled into the AVS if appropriate.             DENISE Newman MD  10/19/2022    12:48 EDT

## 2023-09-21 NOTE — NURSING NOTE
Patient came back from procedure stable. Procedure site showed no signs of bleed or oozing. Patient was monitored for an hour. VSS. Patient and spouse educated on discharge instructions and follow up appointments. Patient and spouse verbalized understanding. IV removed.

## 2023-09-21 NOTE — DISCHARGE INSTRUCTIONS
Discharge Instructions for Permanent Pacemaker/ Defibrillator Implant      Do not resume Eliquis until 9/22/2023    *Keep the incision dry until wound check appointment.  Sponge bathe or hand shower until then.  If it gets a little wet, just pat dmitriy and do not rub the site.    *The Dermabond adhesive will come off over 2-3 weeks on its own.    *Do not raise your elbow on the side of the pacemaker/defibrillator implant above the shoulder level for two weeks but move your arm freely otherwise.    *No heavy lifting (no more than 10 pounds) for one week then resume normal activity.    *Avoid activity that involves rough contact with the pacemaker site.    *Do not swing a bat or golf club for two weeks. Do not swim for two weeks.  It is advised not to drive for first week post-op to protect the incision    *You can resume your normal activities and work as you feel you are able, unless you have been instructed otherwise.      *Report any signs of infection, fever, pain, swelling, redness, oozing or heat at the site especially if the symptoms increase after the first 3 to 4 days. Call my office at   [phone] if signs of infection occurs.    *You Will have an appointment made to allow the wound to be examined for proper healing, this visit is done by the device technician, not the doctor. Another appointment will normally be needed in three months with the doctor.    *Know your pacemaker 's name ([employer])   All testing, troubleshooting and programming of your pacemaker depends on knowing the manufacture of the device. You will receive an ID card for your pacemaker. Carry your pacemaker identification card with you at all times.

## 2023-10-02 ENCOUNTER — CLINICAL SUPPORT NO REQUIREMENTS (OUTPATIENT)
Dept: CARDIOLOGY | Facility: CLINIC | Age: 83
End: 2023-10-02
Payer: OTHER GOVERNMENT

## 2023-10-02 DIAGNOSIS — I44.2 CHB (COMPLETE HEART BLOCK): Primary | ICD-10-CM

## 2023-10-02 PROBLEM — Z95.0 CARDIAC PACEMAKER: Status: ACTIVE | Noted: 2023-10-02

## 2023-10-02 PROCEDURE — 93280 PM DEVICE PROGR EVAL DUAL: CPT | Performed by: INTERNAL MEDICINE

## 2023-10-20 ENCOUNTER — OFFICE VISIT (OUTPATIENT)
Dept: CARDIOLOGY | Facility: CLINIC | Age: 83
End: 2023-10-20
Payer: OTHER GOVERNMENT

## 2023-10-20 VITALS
HEIGHT: 72 IN | DIASTOLIC BLOOD PRESSURE: 99 MMHG | HEART RATE: 66 BPM | SYSTOLIC BLOOD PRESSURE: 150 MMHG | WEIGHT: 191 LBS | BODY MASS INDEX: 25.87 KG/M2

## 2023-10-20 DIAGNOSIS — I44.2 CHB (COMPLETE HEART BLOCK): Primary | ICD-10-CM

## 2023-10-20 DIAGNOSIS — I48.21 PERMANENT ATRIAL FIBRILLATION: ICD-10-CM

## 2023-10-20 DIAGNOSIS — I10 PRIMARY HYPERTENSION: ICD-10-CM

## 2023-10-20 RX ORDER — CARVEDILOL 12.5 MG/1
12.5 TABLET ORAL 2 TIMES DAILY WITH MEALS
Qty: 180 TABLET | Refills: 3 | Status: SHIPPED | OUTPATIENT
Start: 2023-10-20

## 2023-10-20 NOTE — PROGRESS NOTES
Chief Complaint  SSS (sick sinus syndrome), CHB (complete heart block), Hypertension, and pacemaker     Subjective            Adarsh Loving presents to [facility] CARDIOLOGY  Hypertension  Pertinent negatives include no chest pain or palpitations.       Adarsh Loving is here for follow-up evaluation management of permanent atrial fibrillation, complete heart block with dual-chamber pacemaker and hypertension.  Overall he is doing well.  He had a pacemaker generator replacement in September.  He denies chest pain palpitations or excessive shortness of breath.  No bleeding problems on anticoagulation.    PMH  Past Medical History:   Diagnosis Date    Cancer     both kidneys    Chronic kidney disease     COPD (chronic obstructive pulmonary disease)     Gastric ulcer     GERD (gastroesophageal reflux disease)     Hypertension     Sleep apnea     SSS (sick sinus syndrome)     PACER    TIA (transient ischemic attack)          SURGICALHX  Past Surgical History:   Procedure Laterality Date    AMPUTATION      FRACTURE SURGERY      GASTRIC BYPASS      INSERT / REPLACE / REMOVE PACEMAKER  2014    KNEE ARTHROPLASTY Bilateral     PACEMAKER IMPLANTATION N/A 9/21/2023    Procedure: Pacemaker SC generator change -[employer], I want to start early, patient to hold his Eliquis on Tuesday and Wednesday and Thursday of that week;  Surgeon: DENISE Newman MD;  Location: [facility] INVASIVE LOCATION;  Service: Cardiology;  Laterality: N/A;        SOC  Social History     Socioeconomic History    Marital status:    Tobacco Use    Smoking status: Never    Smokeless tobacco: Never   Vaping Use    Vaping Use: Never used   Substance and Sexual Activity    Alcohol use: Never    Drug use: Never    Sexual activity: Defer         FAMHX  Family History   Problem Relation Age of Onset    Hypertension Father           ALLERGY  Allergies   Allergen Reactions    Amlodipine Unknown - Low Severity    Lisinopril Unknown  "- Low Severity    Niacin Unknown - Low Severity    Omeprazole Unknown - Low Severity        MEDSCURRENT    Current Outpatient Medications:     amLODIPine (NORVASC) 10 MG tablet, Take 1 tablet by mouth Daily., Disp: , Rfl:     apixaban (Eliquis) 5 MG tablet tablet, Take 1 tablet by mouth Every 12 (Twelve) Hours., Disp: 180 tablet, Rfl: 3    cholecalciferol (VITAMIN D3) 25 MCG (1000 UT) tablet, Take 800 Units by mouth Daily., Disp: , Rfl:     furosemide (LASIX) 40 MG tablet, 1 tablet Daily., Disp: , Rfl:     levothyroxine (SYNTHROID, LEVOTHROID) 50 MCG tablet, Take 1 tablet by mouth Every Morning., Disp: , Rfl:     losartan (COZAAR) 100 MG tablet, 1 tablet Daily., Disp: , Rfl:     mirtazapine (REMERON) 30 MG tablet, 1 tablet Daily. bedtime, Disp: , Rfl:     pantoprazole (PROTONIX) 40 MG EC tablet, Take 1 tablet by mouth Daily., Disp: , Rfl:     sucralfate (CARAFATE) 1 g tablet, Take 1 tablet by mouth 4 (Four) Times a Day., Disp: , Rfl:     carvedilol (Coreg) 12.5 MG tablet, Take 1 tablet by mouth 2 (Two) Times a Day With Meals., Disp: 180 tablet, Rfl: 3      Review of Systems   Cardiovascular:  Negative for chest pain, dyspnea on exertion and palpitations.   Hematologic/Lymphatic: Does not bruise/bleed easily.        Objective     /99   Pulse 66   Ht 182.9 cm (72\")   Wt 86.6 kg (191 lb)   BMI 25.90 kg/m²       General Appearance:   well developed  well nourished  HENT:   oropharynx moist  lips not cyanotic  Neck:  thyroid not enlarged  supple  Respiratory:  no respiratory distress  normal breath sounds  no rales  Cardiovascular:  no jugular venous distention  regular rhythm  apical impulse normal  S1 normal, S2 normal  no S3, no S4   no murmur  no rub, no thrill  carotid pulses normal; no bruit  pedal pulses normal  lower extremity edema: none    Musculoskeletal:  no clubbing of fingers.   normocephalic, head atraumatic  Skin:   warm, dry  Psychiatric:  judgement and insight appropriate  normal mood and " affect      Result Review :     The following data was reviewed by: González Newman MD on 10/20/2023:    CBC w/diff          9/21/2023    08:17   CBC w/Diff   WBC 3.67    RBC 4.26    Hemoglobin 12.5    Hematocrit 41.0    MCV 96.2    MCH 29.3    MCHC 30.5    RDW 17.2    Platelets 190        BMP          9/21/2023    08:17   BMP   BUN 18    Creatinine 1.41    Sodium 141    Potassium 3.5    Chloride 104    CO2 27.8    Calcium 9.2        Data reviewed : Pacemaker remote monitoring shows normal device function, 11-year battery life, 97% RV pacing, 100% atrial fibrillation      Procedures             Assessment and Plan        ASSESSMENT:  Encounter Diagnoses   Name Primary?    CHB (complete heart block) Yes    Permanent atrial fibrillation     Primary hypertension          PLAN:    1.  Complete heart block with dual-chamber permanent pacemaker-his device is functioning normally since recent change.  Continue remote monitoring  2.  Permanent atrial fibrillation-rate controlled with pacing  3.  Primary hypertension-elevated today.  I am changing him from metoprolol to carvedilol 12.5 mg twice daily.  He will monitor his blood pressures at home and call us with results    Follow-up in about 10 months with a pacemaker check          Patient was given instructions and counseling regarding his condition or for health maintenance advice. Please see specific information pulled into the AVS if appropriate.             DENISE Newman MD  10/20/2023    11:28 EDT

## 2024-08-21 ENCOUNTER — CLINICAL SUPPORT NO REQUIREMENTS (OUTPATIENT)
Dept: CARDIOLOGY | Facility: CLINIC | Age: 84
End: 2024-08-21
Payer: MEDICARE

## 2024-08-21 ENCOUNTER — OFFICE VISIT (OUTPATIENT)
Dept: CARDIOLOGY | Facility: CLINIC | Age: 84
End: 2024-08-21
Payer: MEDICARE

## 2024-08-21 VITALS
DIASTOLIC BLOOD PRESSURE: 99 MMHG | WEIGHT: 187 LBS | SYSTOLIC BLOOD PRESSURE: 145 MMHG | BODY MASS INDEX: 25.33 KG/M2 | HEIGHT: 72 IN | HEART RATE: 65 BPM

## 2024-08-21 DIAGNOSIS — I44.2 CHB (COMPLETE HEART BLOCK): Primary | ICD-10-CM

## 2024-08-21 DIAGNOSIS — Z95.0 CARDIAC PACEMAKER: ICD-10-CM

## 2024-08-21 DIAGNOSIS — I48.21 PERMANENT ATRIAL FIBRILLATION: ICD-10-CM

## 2024-08-21 DIAGNOSIS — I49.5 SSS (SICK SINUS SYNDROME): Primary | ICD-10-CM

## 2024-08-21 DIAGNOSIS — I44.2 CHB (COMPLETE HEART BLOCK): ICD-10-CM

## 2024-08-21 PROCEDURE — 99214 OFFICE O/P EST MOD 30 MIN: CPT | Performed by: INTERNAL MEDICINE

## 2024-08-21 RX ORDER — DOCUSATE CALCIUM 240 MG
240 CAPSULE ORAL 2 TIMES DAILY
COMMUNITY

## 2024-08-21 RX ORDER — HYDROXYZINE 50 MG/1
50 TABLET, FILM COATED ORAL DAILY
COMMUNITY
Start: 2024-04-29

## 2024-08-21 RX ORDER — MEMANTINE HYDROCHLORIDE 5 MG/1
5 TABLET ORAL DAILY
COMMUNITY
Start: 2024-04-29

## 2024-08-21 RX ORDER — CARVEDILOL 25 MG/1
25 TABLET ORAL 2 TIMES DAILY WITH MEALS
Qty: 180 TABLET | Refills: 3 | Status: SHIPPED | OUTPATIENT
Start: 2024-08-21

## 2024-08-21 NOTE — PROGRESS NOTES
Chief Complaint  pacemaker, chb, Atrial Fibrillation, and Hypertension    Subjective            Adarsh Loving presents to [facility] CARDIOLOGY      Adarsh Loving is here for follow-up evaluation management of permanent atrial fibrillation, complete heart block with dual-chamber pacemaker and hypertension.  Overall he is doing well.  He denies chest pain, palpitation or excessive shortness of breath.  He is compliant with his medical therapy.    PMH  Past Medical History:   Diagnosis Date    Cancer     both kidneys    Chronic kidney disease     COPD (chronic obstructive pulmonary disease)     Gastric ulcer     GERD (gastroesophageal reflux disease)     Hypertension     Sleep apnea     SSS (sick sinus syndrome)     PACER    TIA (transient ischemic attack)          SURGICALHX  Past Surgical History:   Procedure Laterality Date    AMPUTATION      FRACTURE SURGERY      GASTRIC BYPASS      INSERT / REPLACE / REMOVE PACEMAKER  2014    KNEE ARTHROPLASTY Bilateral     PACEMAKER IMPLANTATION N/A 9/21/2023    Procedure: Pacemaker SC generator change -[employer], I want to start early, patient to hold his Eliquis on Tuesday and Wednesday and Thursday of that week;  Surgeon: DENISE Newman MD;  Location: [facility] INVASIVE LOCATION;  Service: Cardiology;  Laterality: N/A;        SOC  Social History     Socioeconomic History    Marital status:    Tobacco Use    Smoking status: Never    Smokeless tobacco: Never   Vaping Use    Vaping status: Never Used   Substance and Sexual Activity    Alcohol use: Never    Drug use: Never    Sexual activity: Defer         FAMHX  Family History   Problem Relation Age of Onset    Hypertension Father           ALLERGY  Allergies   Allergen Reactions    Amlodipine Unknown - Low Severity    Lisinopril Unknown - Low Severity    Niacin Unknown - Low Severity    Omeprazole Unknown - Low Severity        MEDSCURRENT    Current Outpatient Medications:     amLODIPine  "(NORVASC) 10 MG tablet, Take 1 tablet by mouth Daily., Disp: , Rfl:     apixaban (Eliquis) 5 MG tablet tablet, Take 1 tablet by mouth Every 12 (Twelve) Hours., Disp: 180 tablet, Rfl: 3    carvedilol (Coreg) 25 MG tablet, Take 1 tablet by mouth 2 (Two) Times a Day With Meals., Disp: 180 tablet, Rfl: 3    cholecalciferol (VITAMIN D3) 25 MCG (1000 UT) tablet, Take 800 Units by mouth Daily., Disp: , Rfl:     docusate calcium (SURFAK) 240 MG capsule, Take 1 capsule by mouth 2 (Two) Times a Day., Disp: , Rfl:     furosemide (LASIX) 40 MG tablet, 1 tablet Daily., Disp: , Rfl:     hydrOXYzine (ATARAX) 50 MG tablet, Take 1 tablet by mouth Daily., Disp: , Rfl:     levothyroxine (SYNTHROID, LEVOTHROID) 50 MCG tablet, Take 1 tablet by mouth Every Morning., Disp: , Rfl:     losartan (COZAAR) 100 MG tablet, 1 tablet Daily., Disp: , Rfl:     memantine (NAMENDA) 5 MG tablet, Take 1 tablet by mouth Daily., Disp: , Rfl:     mirtazapine (REMERON) 30 MG tablet, 1 tablet Daily. bedtime, Disp: , Rfl:     pantoprazole (PROTONIX) 40 MG EC tablet, Take 1 tablet by mouth Daily., Disp: , Rfl:     Polyethylene Glycol 3350 (MIRALAX PO), Take  by mouth., Disp: , Rfl:     sucralfate (CARAFATE) 1 g tablet, Take 1 tablet by mouth 4 (Four) Times a Day., Disp: , Rfl:       Review of Systems   Cardiovascular:  Negative for chest pain, dyspnea on exertion and palpitations.   Hematologic/Lymphatic: Does not bruise/bleed easily.        Objective     /99   Pulse 65   Ht 182.9 cm (72\")   Wt 84.8 kg (187 lb)   BMI 25.36 kg/m²       General Appearance:   well developed  well nourished  HENT:   oropharynx moist  lips not cyanotic  Neck:  thyroid not enlarged  supple  Respiratory:  no respiratory distress  normal breath sounds  no rales  Cardiovascular:  no jugular venous distention  regular rhythm  apical impulse normal  S1 normal, S2 normal  no S3, no S4   no murmur  no rub, no thrill  carotid pulses normal; no bruit  pedal pulses normal  lower " extremity edema: none    Musculoskeletal:  no clubbing of fingers.   normocephalic, head atraumatic  Skin:   warm, dry  Psychiatric:  judgement and insight appropriate  normal mood and affect      Result Review :     The following data was reviewed by: González Newman MD on 10/20/2023:    CBC w/diff          9/21/2023    08:17   CBC w/Diff   WBC 3.67    RBC 4.26    Hemoglobin 12.5    Hematocrit 41.0    MCV 96.2    MCH 29.3    MCHC 30.5    RDW 17.2    Platelets 190        BMP          9/21/2023    08:17   BMP   BUN 18    Creatinine 1.41    Sodium 141    Potassium 3.5    Chloride 104    CO2 27.8    Calcium 9.2        Data reviewed : Pacemaker interrogation today shows normal device function, 10-1/2-year battery life, 98% ventricular pacing persistent atrial fibrillation    Procedures             Assessment and Plan        ASSESSMENT:  Encounter Diagnoses   Name Primary?    SSS (sick sinus syndrome) Yes    CHB (complete heart block)     Cardiac pacemaker     Permanent atrial fibrillation          PLAN:    1.  Complete heart block with dual-chamber permanent pacemaker-his device is functioning normally since recent change.  Continue remote monitoring  2.  Permanent atrial fibrillation-rate controlled with pacing  3.  Primary hypertension-elevated today.  Increase carvedilol to 25 mg twice a day.    Follow-up in about 10 months          Patient was given instructions and counseling regarding his condition or for health maintenance advice. Please see specific information pulled into the AVS if appropriate.             DENISE Newman MD  8/21/2024    11:30 EDT

## 2024-08-21 NOTE — PROGRESS NOTES
Normal VVIR Chamber Pacemaker Device Interrogation and Device Testing.  Normal evaluation of device function and lead measurements.  No optimization was needed of parameters or maximization of device longevity.  Patient is on automated Home Remote Monitoring.

## 2025-02-19 ENCOUNTER — TELEPHONE (OUTPATIENT)
Dept: CARDIOLOGY | Facility: CLINIC | Age: 85
End: 2025-02-19
Payer: OTHER GOVERNMENT

## 2025-02-19 NOTE — TELEPHONE ENCOUNTER
Procedure:Cystoscopy, Bipolar Transurethral Resection of Prostate    Med Directive:Eliquis (request 2 days)    PMH:Pacemaker, permanent atrial fibrillation    Last Seen:08/21/2024

## 2025-02-19 NOTE — TELEPHONE ENCOUNTER
Patient may proceed with upcoming surgical procedure at moderate risk, may hold Eliquis for 2 days prior to procedure.

## 2025-02-19 NOTE — TELEPHONE ENCOUNTER
[facility] received a fax that requires your attention. The document has been indexed to the patient’s chart for your review.      Reason for sending: EXTERNAL MEDICAL RECORD NOTIFICATION     Documents Description: MED REC REQ-[facility]-2.19.25    Name of Sender: [facility]     Date Indexed: 2.19.25

## 2025-02-19 NOTE — TELEPHONE ENCOUNTER
[facility] received a fax that requires your attention. The document has been indexed to the patient’s chart for your review.      Reason for sending: EXTERNAL MEDICAL RECORD NOTIFICATION     Documents Description: CARDIAC CLEARANCE REQ-[facility]    Name of Sender: [facility]     Date Indexed: 2.19.25

## 2025-06-23 ENCOUNTER — OFFICE VISIT (OUTPATIENT)
Dept: CARDIOLOGY | Facility: CLINIC | Age: 85
End: 2025-06-23
Payer: OTHER GOVERNMENT

## 2025-06-23 VITALS
BODY MASS INDEX: 25.33 KG/M2 | SYSTOLIC BLOOD PRESSURE: 88 MMHG | WEIGHT: 187 LBS | HEIGHT: 72 IN | DIASTOLIC BLOOD PRESSURE: 63 MMHG | HEART RATE: 64 BPM

## 2025-06-23 DIAGNOSIS — I44.2 CHB (COMPLETE HEART BLOCK): Primary | ICD-10-CM

## 2025-06-23 DIAGNOSIS — Z95.0 CARDIAC PACEMAKER: ICD-10-CM

## 2025-06-23 DIAGNOSIS — I10 PRIMARY HYPERTENSION: ICD-10-CM

## 2025-06-23 DIAGNOSIS — I48.21 PERMANENT ATRIAL FIBRILLATION: ICD-10-CM

## 2025-06-23 PROCEDURE — 99214 OFFICE O/P EST MOD 30 MIN: CPT | Performed by: INTERNAL MEDICINE

## 2025-06-23 NOTE — PROGRESS NOTES
Chief Complaint  Follow-up    Subjective            Adarsh Loving presents to [facility] CARDIOLOGY      Adarsh Loving is here for follow-up evaluation management of permanent atrial fibrillation, complete heart block with dual-chamber pacemaker and hypertension.  Overall he is doing well.  He denies chest pain palpitations or excessive shortness of breath.  His blood pressure at times runs low but he is asymptomatic.    PMH  Past Medical History:   Diagnosis Date    Cancer     both kidneys    Chronic kidney disease     COPD (chronic obstructive pulmonary disease)     Gastric ulcer     GERD (gastroesophageal reflux disease)     Hypertension     Sleep apnea     SSS (sick sinus syndrome)     PACER    TIA (transient ischemic attack)          SURGICALHX  Past Surgical History:   Procedure Laterality Date    AMPUTATION      FRACTURE SURGERY      GASTRIC BYPASS      INSERT / REPLACE / REMOVE PACEMAKER  2014    KNEE ARTHROPLASTY Bilateral     PACEMAKER IMPLANTATION N/A 9/21/2023    Procedure: Pacemaker SC generator change -Imnish, I want to start early, patient to hold his Eliquis on Tuesday and Wednesday and Thursday of that week;  Surgeon: DENISE Newman MD;  Location: [facility] INVASIVE LOCATION;  Service: Cardiology;  Laterality: N/A;        SOC  Social History     Socioeconomic History    Marital status:    Tobacco Use    Smoking status: Never    Smokeless tobacco: Never   Vaping Use    Vaping status: Never Used   Substance and Sexual Activity    Alcohol use: Never    Drug use: Never    Sexual activity: Defer         FAMHX  Family History   Problem Relation Age of Onset    Hypertension Father           ALLERGY  Allergies   Allergen Reactions    Amlodipine Unknown - Low Severity    Lisinopril Unknown - Low Severity    Niacin Unknown - Low Severity    Omeprazole Unknown - Low Severity        MEDSCURRENT    Current Outpatient Medications:     amLODIPine (NORVASC) 10 MG tablet,  "Take 1 tablet by mouth Daily., Disp: , Rfl:     apixaban (Eliquis) 5 MG tablet tablet, Take 1 tablet by mouth Every 12 (Twelve) Hours., Disp: 180 tablet, Rfl: 3    carvedilol (Coreg) 25 MG tablet, Take 1 tablet by mouth 2 (Two) Times a Day With Meals., Disp: 180 tablet, Rfl: 3    cholecalciferol (VITAMIN D3) 25 MCG (1000 UT) tablet, Take 800 Units by mouth Daily., Disp: , Rfl:     furosemide (LASIX) 40 MG tablet, 1 tablet Daily., Disp: , Rfl:     hydrOXYzine (ATARAX) 50 MG tablet, Take 1 tablet by mouth Daily., Disp: , Rfl:     levothyroxine (SYNTHROID, LEVOTHROID) 50 MCG tablet, Take 1 tablet by mouth Every Morning., Disp: , Rfl:     losartan (COZAAR) 100 MG tablet, 1 tablet Daily., Disp: , Rfl:     memantine (NAMENDA) 5 MG tablet, Take 1 tablet by mouth Daily., Disp: , Rfl:     mirtazapine (REMERON) 30 MG tablet, 1 tablet Daily. bedtime, Disp: , Rfl:     pantoprazole (PROTONIX) 40 MG EC tablet, Take 1 tablet by mouth Daily., Disp: , Rfl:     Polyethylene Glycol 3350 (MIRALAX PO), Take  by mouth., Disp: , Rfl:     sucralfate (CARAFATE) 1 g tablet, Take 1 tablet by mouth 4 (Four) Times a Day., Disp: , Rfl:     docusate calcium (SURFAK) 240 MG capsule, Take 1 capsule by mouth 2 (Two) Times a Day. (Patient not taking: Reported on 6/23/2025), Disp: , Rfl:       Review of Systems   Cardiovascular:  Negative for chest pain, dyspnea on exertion and palpitations.   Hematologic/Lymphatic: Does not bruise/bleed easily.        Objective     BP (!) 88/63   Pulse 64   Ht 182.9 cm (72\")   Wt 84.8 kg (187 lb)   BMI 25.36 kg/m²       General Appearance:   well developed  well nourished  HENT:   oropharynx moist  lips not cyanotic  Neck:  thyroid not enlarged  supple  Respiratory:  no respiratory distress  normal breath sounds  no rales  Cardiovascular:  no jugular venous distention  regular rhythm  apical impulse normal  S1 normal, S2 normal  no S3, no S4   no murmur  no rub, no thrill  carotid pulses normal; no bruit  pedal " pulses normal  lower extremity edema: none    Musculoskeletal:  no clubbing of fingers.   normocephalic, head atraumatic  Skin:   warm, dry  Psychiatric:  judgement and insight appropriate  normal mood and affect      Result Review :     The following data was reviewed by: González Newman MD on 10/20/2023:    CBC w/diff          9/21/2023    08:17   CBC w/Diff   WBC 3.67    RBC 4.26    Hemoglobin 12.5    Hematocrit 41.0    MCV 96.2    MCH 29.3    MCHC 30.5    RDW 17.2    Platelets 190        BMP          2/19/2025    11:16   BMP   Glucose 94       BUN 21       Creatinine 1.5       Sodium 143       Potassium 3.7       Chloride 107       CO2 28       Calcium 8.5          Details          This result is from an external source.               Data reviewed : Pacemaker remote monitoring shows normal device function, 10-year battery life, 99% ventricular pacing with underlying permanent atrial fibrillation    Procedures             Assessment and Plan        ASSESSMENT:  Encounter Diagnoses   Name Primary?    CHB (complete heart block) Yes    Cardiac pacemaker     Permanent atrial fibrillation     Primary hypertension          PLAN:    1.  Complete heart block with dual-chamber permanent pacemaker-the device is functioning normally.  Continue remote monitoring.  He will be scheduled for a pacemaker interrogation next visit  2.  Permanent atrial fibrillation-rate controlled with pacing, continue anticoagulation  3.  Primary hypertension-low today, asymptomatic.  Continue current medical therapy    Follow-up in about 6 months with a device interrogation          Patient was given instructions and counseling regarding his condition or for health maintenance advice. Please see specific information pulled into the AVS if appropriate.             DENISE Newman MD  6/23/2025    11:10 EDT

## (undated) DEVICE — PACEMAKER PACK: Brand: MEDLINE INDUSTRIES, INC.

## (undated) DEVICE — PLASMABLADE PS200-040 4.0: Brand: PLASMABLADE™